# Patient Record
Sex: FEMALE | Race: WHITE | NOT HISPANIC OR LATINO | Employment: FULL TIME | URBAN - NONMETROPOLITAN AREA
[De-identification: names, ages, dates, MRNs, and addresses within clinical notes are randomized per-mention and may not be internally consistent; named-entity substitution may affect disease eponyms.]

---

## 2019-12-17 ENCOUNTER — TELEPHONE (OUTPATIENT)
Dept: SURGERY | Facility: CLINIC | Age: 44
End: 2019-12-17

## 2019-12-17 NOTE — TELEPHONE ENCOUNTER
I left a voice mail message for the patient in reference to Dr. Georges Aguirre.  Dr. Krause wanted her to have contact information for Dr. Aguirre, so that she could set up an appointment.    Dr. Georges Aguirre, [facility], [phone], [address] [place] [address], [place] [address].

## 2020-10-16 NOTE — PROGRESS NOTES
Patient: Maria Victoria Pena    YOB: 1975    Date: 10/21/2020    Primary Care Provider: Phi Snider PA    Chief Complaint   Patient presents with   • Abdominal Pain     Cramping and alot of rectal bleeding       SUBJECTIVE:    History of present illness:  I saw the patient in the office today as a consultation for evaluation and treatment of rectal bleeding. Patient has history of ulcerative colitis. She complains of diarrhea with blood about 40 times a day. Patient states it is difficult to eat anything. She is on steroids now that a Dr in [place] gave her on 10/09/20 and it isn't helping.  Last colonoscopy 15 years ago, patient was remission for many years.  No family history of colon cancer.  No weight loss or anemia.  No nausea or vomiting.  Pain is crampy in nature, not relieved by bowel movement.  Last 1 to 1-1/2 hours after eating.    The following portions of the patient's history were reviewed and updated as appropriate: allergies, current medications, past family history, past medical history, past social history, past surgical history and problem list.    Review of Systems   Constitutional: Negative for chills, fever and unexpected weight change.   HENT: Negative for hearing loss, trouble swallowing and voice change.    Eyes: Negative for visual disturbance.   Respiratory: Negative for apnea, cough, chest tightness, shortness of breath and wheezing.    Cardiovascular: Negative for chest pain, palpitations and leg swelling.   Gastrointestinal: Positive for abdominal pain, blood in stool and diarrhea. Negative for abdominal distention, anal bleeding, constipation, nausea, rectal pain and vomiting.   Endocrine: Negative for cold intolerance and heat intolerance.   Genitourinary: Negative for difficulty urinating, dysuria and flank pain.   Musculoskeletal: Negative for back pain and gait problem.   Skin: Negative for color change, rash and wound.   Neurological: Negative for dizziness,  "syncope, speech difficulty, weakness, light-headedness, numbness and headaches.   Hematological: Negative for adenopathy. Does not bruise/bleed easily.   Psychiatric/Behavioral: Negative for confusion. The patient is not nervous/anxious.        History:  Past Medical History:   Diagnosis Date   • Rectal bleeding        Past Surgical History:   Procedure Laterality Date   • COLONOSCOPY         Family History   Problem Relation Age of Onset   • Heart disease Father    • Cancer Maternal Aunt    • Diabetes Maternal Grandmother    • Heart disease Maternal Grandmother        Social History     Tobacco Use   • Smoking status: Never Smoker   • Smokeless tobacco: Never Used   Substance Use Topics   • Alcohol use: Never     Frequency: Never   • Drug use: Never       Medications:   Current Outpatient Medications:   •  predniSONE (DELTASONE) 10 MG tablet, TAKE 2 TABLETS BY MOUTH DAILY FOR 7 DAYS 1.5 TABLETS DAILY FOR 7 DAYS 1 TABLET DAILY FOR 7 DAYS THEN STOP, Disp: , Rfl:        Allergies: No Known Allergies    OBJECTIVE:    Vital Signs:  Vitals:    10/21/20 0904   BP: 120/82   Pulse: 78   Temp: 97.6 °F (36.4 °C)   SpO2: 99%   Weight: 81 kg (178 lb 9.6 oz)   Height: 154.9 cm (61\")       Physical Exam:   General Appearance:    Alert, cooperative, in no acute distress   Head:    Normocephalic, without obvious abnormality, atraumatic   Eyes:            Lids and lashes normal, conjunctivae and sclerae normal, no   icterus, no pallor, corneas clear, PERRL   Ears:    Ears appear intact with no abnormalities noted   Throat:   No oral lesions, no thrush, oral mucosa moist   Neck:   No adenopathy, supple, trachea midline, no thyromegaly,  no JVD   Lungs:     Clear to auscultation,respirations regular, even and                  unlabored    Heart:    Regular rhythm and normal rate, normal S1 and S2, no            murmur   Abdomen:     no masses, no organomegaly, soft non-tender, non-distended, no guarding   Extremities:   Moves all " extremities well, no edema, no cyanosis, no             redness   Pulses:   Pulses palpable and equal bilaterally   Skin:   No bleeding, bruising or rash   Lymph nodes:   No palpable adenopathy   Neurologic:   Cranial nerves 2 - 12 grossly intact, sensation intact  Psychiatric: No evidence of depression or anxiety   Results Review:   I reviewed the patient's new clinical results.    Review of Systems was reviewed and confirmed as accurate as documented by the MA.    ASSESSMENT/PLAN:    1. Ulcerative colitis with rectal bleeding, unspecified location (CMS/HCC)    2. Rectal bleed    3. Generalized abdominal pain        I recommend a colonoscopy for further evaluation. The procedure was explained as well as the risks which include but are not limited to bleeding, infection, perforation, abdominal pain etc. The patient understands these risks and the procedure and wishes to proceed.  Patient placed on probiotics and Greek yogurt daily.  Avoid dairy products.     Electronically signed by Manjula Merrill MD  10/21/20  10:05 EDT

## 2020-10-21 ENCOUNTER — OFFICE VISIT (OUTPATIENT)
Dept: SURGERY | Facility: CLINIC | Age: 45
End: 2020-10-21

## 2020-10-21 VITALS
DIASTOLIC BLOOD PRESSURE: 82 MMHG | HEIGHT: 61 IN | HEART RATE: 78 BPM | WEIGHT: 178.6 LBS | TEMPERATURE: 97.6 F | SYSTOLIC BLOOD PRESSURE: 120 MMHG | OXYGEN SATURATION: 99 % | BODY MASS INDEX: 33.72 KG/M2

## 2020-10-21 DIAGNOSIS — R10.84 GENERALIZED ABDOMINAL PAIN: ICD-10-CM

## 2020-10-21 DIAGNOSIS — K62.5 RECTAL BLEED: ICD-10-CM

## 2020-10-21 DIAGNOSIS — Z01.818 PRE-OP TESTING: Primary | ICD-10-CM

## 2020-10-21 DIAGNOSIS — K51.911 ULCERATIVE COLITIS WITH RECTAL BLEEDING, UNSPECIFIED LOCATION (HCC): Primary | ICD-10-CM

## 2020-10-21 PROCEDURE — 99203 OFFICE O/P NEW LOW 30 MIN: CPT | Performed by: SURGERY

## 2020-10-21 RX ORDER — PREDNISONE 10 MG/1
TABLET ORAL
COMMUNITY
Start: 2020-10-09

## 2020-10-22 RX ORDER — BISACODYL 5 MG
TABLET, DELAYED RELEASE (ENTERIC COATED) ORAL
Qty: 4 TABLET | Refills: 0 | Status: SHIPPED | OUTPATIENT
Start: 2020-10-22

## 2020-10-22 RX ORDER — POLYETHYLENE GLYCOL 3350 17 G/17G
238 POWDER, FOR SOLUTION ORAL ONCE
Qty: 1 PACKET | Refills: 0 | Status: SHIPPED | OUTPATIENT
Start: 2020-10-22 | End: 2020-10-22

## 2020-10-26 ENCOUNTER — LAB (OUTPATIENT)
Dept: LAB | Facility: HOSPITAL | Age: 45
End: 2020-10-26

## 2020-10-26 ENCOUNTER — APPOINTMENT (OUTPATIENT)
Dept: LAB | Facility: HOSPITAL | Age: 45
End: 2020-10-26

## 2020-10-26 DIAGNOSIS — Z01.818 PRE-OP TESTING: Primary | ICD-10-CM

## 2020-10-26 PROCEDURE — U0004 COV-19 TEST NON-CDC HGH THRU: HCPCS | Performed by: SURGERY

## 2020-10-26 PROCEDURE — C9803 HOPD COVID-19 SPEC COLLECT: HCPCS

## 2020-10-26 RX ORDER — POLYETHYLENE GLYCOL 3350 17 G/17G
17 POWDER, FOR SOLUTION ORAL DAILY
COMMUNITY

## 2020-10-27 LAB — SARS-COV-2 RNA RESP QL NAA+PROBE: NOT DETECTED

## 2020-10-28 ENCOUNTER — ANESTHESIA (OUTPATIENT)
Dept: GASTROENTEROLOGY | Facility: HOSPITAL | Age: 45
End: 2020-10-28

## 2020-10-28 ENCOUNTER — ANESTHESIA EVENT (OUTPATIENT)
Dept: GASTROENTEROLOGY | Facility: HOSPITAL | Age: 45
End: 2020-10-28

## 2020-10-28 ENCOUNTER — HOSPITAL ENCOUNTER (OUTPATIENT)
Facility: HOSPITAL | Age: 45
Setting detail: HOSPITAL OUTPATIENT SURGERY
Discharge: HOME | End: 2020-10-28
Attending: SURGERY | Admitting: SURGERY

## 2020-10-28 VITALS
TEMPERATURE: 97.8 F | OXYGEN SATURATION: 100 % | DIASTOLIC BLOOD PRESSURE: 76 MMHG | HEIGHT: 61 IN | RESPIRATION RATE: 18 BRPM | SYSTOLIC BLOOD PRESSURE: 126 MMHG | BODY MASS INDEX: 32.71 KG/M2 | HEART RATE: 77 BPM | WEIGHT: 173.25 LBS

## 2020-10-28 DIAGNOSIS — R10.84 GENERALIZED ABDOMINAL PAIN: ICD-10-CM

## 2020-10-28 DIAGNOSIS — K51.911 ULCERATIVE COLITIS WITH RECTAL BLEEDING, UNSPECIFIED LOCATION (HCC): ICD-10-CM

## 2020-10-28 DIAGNOSIS — K62.5 RECTAL BLEED: ICD-10-CM

## 2020-10-28 LAB
B-HCG UR QL: NEGATIVE
INTERNAL NEGATIVE CONTROL: NEGATIVE
INTERNAL POSITIVE CONTROL: POSITIVE
Lab: NORMAL

## 2020-10-28 PROCEDURE — 81025 URINE PREGNANCY TEST: CPT | Performed by: SURGERY

## 2020-10-28 PROCEDURE — 45380 COLONOSCOPY AND BIOPSY: CPT | Performed by: SURGERY

## 2020-10-28 PROCEDURE — 25010000002 PROPOFOL 10 MG/ML EMULSION: Performed by: NURSE ANESTHETIST, CERTIFIED REGISTERED

## 2020-10-28 RX ORDER — KETAMINE HYDROCHLORIDE 50 MG/ML
INJECTION, SOLUTION, CONCENTRATE INTRAMUSCULAR; INTRAVENOUS AS NEEDED
Status: DISCONTINUED | OUTPATIENT
Start: 2020-10-28 | End: 2020-10-28 | Stop reason: SURG

## 2020-10-28 RX ORDER — ONDANSETRON 2 MG/ML
4 INJECTION INTRAMUSCULAR; INTRAVENOUS ONCE AS NEEDED
Status: DISCONTINUED | OUTPATIENT
Start: 2020-10-28 | End: 2020-10-28 | Stop reason: HOSPADM

## 2020-10-28 RX ORDER — ONDANSETRON 2 MG/ML
4 INJECTION INTRAMUSCULAR; INTRAVENOUS ONCE AS NEEDED
Status: CANCELLED | OUTPATIENT
Start: 2020-10-28 | End: 2020-10-28

## 2020-10-28 RX ORDER — LIDOCAINE HYDROCHLORIDE 20 MG/ML
INJECTION, SOLUTION INTRAVENOUS AS NEEDED
Status: DISCONTINUED | OUTPATIENT
Start: 2020-10-28 | End: 2020-10-28 | Stop reason: SURG

## 2020-10-28 RX ORDER — SODIUM CHLORIDE, SODIUM LACTATE, POTASSIUM CHLORIDE, CALCIUM CHLORIDE 600; 310; 30; 20 MG/100ML; MG/100ML; MG/100ML; MG/100ML
1000 INJECTION, SOLUTION INTRAVENOUS CONTINUOUS
Status: DISCONTINUED | OUTPATIENT
Start: 2020-10-28 | End: 2020-10-28 | Stop reason: HOSPADM

## 2020-10-28 RX ORDER — PROPOFOL 10 MG/ML
VIAL (ML) INTRAVENOUS AS NEEDED
Status: DISCONTINUED | OUTPATIENT
Start: 2020-10-28 | End: 2020-10-28 | Stop reason: SURG

## 2020-10-28 RX ADMIN — LIDOCAINE HYDROCHLORIDE 100 MG: 20 INJECTION, SOLUTION INTRAVENOUS at 13:43

## 2020-10-28 RX ADMIN — PROPOFOL 350 MG: 10 INJECTION, EMULSION INTRAVENOUS at 13:57

## 2020-10-28 RX ADMIN — KETAMINE HYDROCHLORIDE 50 MG: 50 INJECTION, SOLUTION INTRAMUSCULAR; INTRAVENOUS at 13:43

## 2020-10-28 RX ADMIN — SODIUM CHLORIDE, POTASSIUM CHLORIDE, SODIUM LACTATE AND CALCIUM CHLORIDE 1000 ML: 600; 310; 30; 20 INJECTION, SOLUTION INTRAVENOUS at 12:58

## 2020-10-28 NOTE — ANESTHESIA PREPROCEDURE EVALUATION
Anesthesia Evaluation     Patient summary reviewed and Nursing notes reviewed   history of anesthetic complications (history of anesthesia awareness ):  NPO Solid Status: > 8 hours  NPO Liquid Status: > 8 hours           Airway   Mallampati: II  TM distance: >3 FB  Neck ROM: full  No difficulty expected  Dental - normal exam     Pulmonary - normal exam   (+) a smoker (quit in 2000) Former,   Cardiovascular - normal exam        Neuro/Psych  GI/Hepatic/Renal/Endo    (+)  GI bleeding (ulerative colitis) , renal disease stones,     Musculoskeletal     Abdominal    Substance History      OB/GYN          Other                        Anesthesia Plan    ASA 2     MAC   (Risks and benefits discussed including risk of aspiration, recall and dental damage. All patient questions answered. Will continue with POC.)  intravenous induction     Anesthetic plan, all risks, benefits, and alternatives have been provided, discussed and informed consent has been obtained with: patient.    Plan discussed with CRNA.

## 2020-10-29 ENCOUNTER — TELEPHONE (OUTPATIENT)
Dept: SURGERY | Facility: CLINIC | Age: 45
End: 2020-10-29

## 2020-10-29 NOTE — TELEPHONE ENCOUNTER
Patient called stating she has ulcerative colitis and is requesting a strong steroid prescription to help with symptoms. She states she is unable to work at this time due to having a flare up.

## 2020-10-29 NOTE — TELEPHONE ENCOUNTER
I am not sure which medication is best.  I would recommend appointment with gastroenterology at [facility] about the [place] for evaluation and treatment plan.  Will await final pathology report and discussed with patient on follow-up.  Have her continue Questran to alleviate diarrhea symptoms.

## 2020-10-29 NOTE — TELEPHONE ENCOUNTER
I will place patient on Questran, however try that for diarrhea.  She is already on prednisone, a big dose.  I am not sure is the right treatment.  I will likely send her to a specialist to get treated for ulcerative colitis.  Go ahead and try to get an appointment with gastroenterology at [facility] in [place] for treatment plan.

## 2020-10-29 NOTE — TELEPHONE ENCOUNTER
Patient states she request a new medication for ulcerative colitis. She states what you feel is best for her symptoms.  Please advise

## 2020-10-29 NOTE — TELEPHONE ENCOUNTER
Patient will need to follow-up with her ulcerative colitis  For stronger prednisone dose.  Or have family physician do it.  I usually do not write for steroids.

## 2020-10-31 LAB
LAB AP CASE REPORT: NORMAL
PATH REPORT.FINAL DX SPEC: NORMAL

## 2020-11-05 NOTE — PROGRESS NOTES
Patient: Maria Victoria Pena    YOB: 1975    Date: 11/06/2020    Primary Care Provider: Phi Snider PA    Reason for Consultation: Follow-up colonoscopy    Chief Complaint   Patient presents with   • Follow-up     Colonoscopy       History of present illness:  I saw the patient in the office today as a followup from their recent colonoscopy with polypectomy, the pathology report did show chronic colitis with focal activity.  Patient continues to have many bowel movements a day.  Some days up to 40 a day.  She states that they are bloody if she has not eaten and mucus after eating.  The diarrhea has been ongoing for approximately 6 weeks.  Patient improving clinically, diarrhea decreased.  Patient with history of ulcerative colitis.  Has been on several regimens of steroids per family physician.    The following portions of the patient's history were reviewed and updated as appropriate: allergies, current medications, past family history, past medical history, past social history, past surgical history and problem list.    Review of Systems   Constitutional: Negative for chills, fever and unexpected weight change.   HENT: Negative for hearing loss, trouble swallowing and voice change.    Eyes: Negative for visual disturbance.   Respiratory: Negative for apnea, cough, chest tightness, shortness of breath and wheezing.    Cardiovascular: Negative for chest pain, palpitations and leg swelling.   Gastrointestinal: Positive for abdominal pain, blood in stool and diarrhea. Negative for abdominal distention, anal bleeding, constipation, nausea, rectal pain and vomiting.   Endocrine: Negative for cold intolerance and heat intolerance.   Genitourinary: Negative for difficulty urinating, dysuria and flank pain.   Musculoskeletal: Negative for back pain and gait problem.   Skin: Negative for color change, rash and wound.   Neurological: Negative for dizziness, syncope, speech difficulty, weakness,  "light-headedness, numbness and headaches.   Hematological: Negative for adenopathy. Does not bruise/bleed easily.   Psychiatric/Behavioral: Negative for confusion. The patient is not nervous/anxious.        Allergies:  No Known Allergies    Medications:    Current Outpatient Medications:   •  bisacodyl (Dulcolax) 5 MG EC tablet, Take 2 at 3pm and 2 at 7pm day prior to colonoscopy., Disp: 4 tablet, Rfl: 0  •  cholestyramine (Questran) 4 GM/DOSE powder, Take 1 packet by mouth 3 (Three) Times a Day With Meals., Disp: 30 packet, Rfl: 6  •  polyethylene glycol (MiraLax) 17 g packet, Take 17 g by mouth Daily., Disp: , Rfl:   •  predniSONE (DELTASONE) 10 MG tablet, TAKE 2 TABLETS BY MOUTH DAILY FOR 7 DAYS 1.5 TABLETS DAILY FOR 7 DAYS 1 TABLET DAILY FOR 7 DAYS THEN STOP, Disp: , Rfl:     Vital Signs:  Vitals:    11/06/20 1452   BP: 140/100   Pulse: 82   Temp: 98.6 °F (37 °C)   TempSrc: Temporal   SpO2: 99%   Weight: 78.5 kg (173 lb)   Height: 154.9 cm (61\")       Physical Exam:   General Appearance:    Alert, cooperative, in no acute distress   Abdomen:     no masses, no organomegaly, soft with minimal left lower quadrant tenderness.  No rebound or guarding.   Chest:      Clear to ausculation            Cor:     Regular rate and rhythm    Results Review:   I reviewed the patient's new clinical results.    Assessment / Plan:    1. Colitis    2. Chronic diarrhea        I did discuss the situation with the patient today in the office and they have done well from their recent colonoscopy with polypectomy.  I have released the patient back to normal activity.  I need to see the patient back in the office in 5 years and they will need to have repeat colonoscopy at that time.  Patient started on Questran for diarrhea, also referred to gastroenterology for management of possible recurrence of ulcerative colitis.    Electronically signed by Manjula Merrill MD  11/06/20                  "

## 2020-11-06 ENCOUNTER — OFFICE VISIT (OUTPATIENT)
Dept: SURGERY | Facility: CLINIC | Age: 45
End: 2020-11-06

## 2020-11-06 VITALS
TEMPERATURE: 98.6 F | HEIGHT: 61 IN | SYSTOLIC BLOOD PRESSURE: 140 MMHG | BODY MASS INDEX: 32.66 KG/M2 | HEART RATE: 82 BPM | OXYGEN SATURATION: 99 % | DIASTOLIC BLOOD PRESSURE: 100 MMHG | WEIGHT: 173 LBS

## 2020-11-06 DIAGNOSIS — K52.9 COLITIS: Primary | ICD-10-CM

## 2020-11-06 DIAGNOSIS — K52.9 CHRONIC DIARRHEA: ICD-10-CM

## 2020-11-06 PROCEDURE — 99212 OFFICE O/P EST SF 10 MIN: CPT | Performed by: SURGERY

## 2020-11-06 RX ORDER — CHOLESTYRAMINE 4 G/9G
4 POWDER, FOR SUSPENSION ORAL
Qty: 30 PACKET | Refills: 6 | Status: SHIPPED | OUTPATIENT
Start: 2020-11-06

## 2023-07-28 ENCOUNTER — APPOINTMENT (OUTPATIENT)
Dept: CT IMAGING | Facility: HOSPITAL | Age: 48
DRG: 387 | End: 2023-07-28
Payer: COMMERCIAL

## 2023-07-28 ENCOUNTER — ANESTHESIA EVENT (OUTPATIENT)
Dept: GASTROENTEROLOGY | Facility: HOSPITAL | Age: 48
DRG: 387 | End: 2023-07-28
Payer: COMMERCIAL

## 2023-07-28 ENCOUNTER — HOSPITAL ENCOUNTER (INPATIENT)
Facility: HOSPITAL | Age: 48
LOS: 5 days | Discharge: HOME | DRG: 387 | End: 2023-08-02
Attending: EMERGENCY MEDICINE | Admitting: INTERNAL MEDICINE
Payer: COMMERCIAL

## 2023-07-28 DIAGNOSIS — K51.011 ULCERATIVE PANCOLITIS WITH RECTAL BLEEDING: ICD-10-CM

## 2023-07-28 DIAGNOSIS — Z87.19 HISTORY OF ULCERATIVE COLITIS: ICD-10-CM

## 2023-07-28 DIAGNOSIS — R70.0 SEDIMENTATION RATE ELEVATION: ICD-10-CM

## 2023-07-28 DIAGNOSIS — R10.84 GENERALIZED ABDOMINAL PAIN: ICD-10-CM

## 2023-07-28 DIAGNOSIS — K51.00 PANCOLITIS: ICD-10-CM

## 2023-07-28 DIAGNOSIS — K92.1 HEMATOCHEZIA: Primary | ICD-10-CM

## 2023-07-28 PROBLEM — R16.1 SPLENOMEGALY: Status: ACTIVE | Noted: 2023-07-28

## 2023-07-28 PROBLEM — R19.7 BLOODY DIARRHEA: Status: ACTIVE | Noted: 2023-07-28

## 2023-07-28 LAB
ADV 40+41 DNA STL QL NAA+NON-PROBE: NOT DETECTED
ALBUMIN SERPL-MCNC: 3.5 G/DL (ref 3.5–5.2)
ALBUMIN/GLOB SERPL: 1.3 G/DL
ALP SERPL-CCNC: 70 U/L (ref 39–117)
ALT SERPL W P-5'-P-CCNC: 12 U/L (ref 1–33)
ANION GAP SERPL CALCULATED.3IONS-SCNC: 11 MMOL/L (ref 5–15)
AST SERPL-CCNC: 17 U/L (ref 1–32)
ASTRO TYP 1-8 RNA STL QL NAA+NON-PROBE: NOT DETECTED
BACTERIA UR QL AUTO: ABNORMAL /HPF
BASOPHILS # BLD AUTO: 0.05 10*3/MM3 (ref 0–0.2)
BASOPHILS NFR BLD AUTO: 0.8 % (ref 0–1.5)
BILIRUB SERPL-MCNC: 0.3 MG/DL (ref 0–1.2)
BILIRUB UR QL STRIP: NEGATIVE
BUN SERPL-MCNC: 4 MG/DL (ref 6–20)
BUN/CREAT SERPL: 8 (ref 7–25)
C CAYETANENSIS DNA STL QL NAA+NON-PROBE: NOT DETECTED
C COLI+JEJ+UPSA DNA STL QL NAA+NON-PROBE: NOT DETECTED
C DIFF TOX GENS STL QL NAA+PROBE: NOT DETECTED
CALCIUM SPEC-SCNC: 8.5 MG/DL (ref 8.6–10.5)
CHLORIDE SERPL-SCNC: 105 MMOL/L (ref 98–107)
CLARITY UR: CLEAR
CO2 SERPL-SCNC: 24 MMOL/L (ref 22–29)
COLOR UR: YELLOW
CREAT SERPL-MCNC: 0.5 MG/DL (ref 0.57–1)
CRP SERPL-MCNC: 2.76 MG/DL (ref 0–0.5)
CRYPTOSP DNA STL QL NAA+NON-PROBE: NOT DETECTED
D-LACTATE SERPL-SCNC: 0.9 MMOL/L (ref 0.5–2)
DEPRECATED RDW RBC AUTO: 42.7 FL (ref 37–54)
E HISTOLYT DNA STL QL NAA+NON-PROBE: NOT DETECTED
EAEC PAA PLAS AGGR+AATA ST NAA+NON-PRB: NOT DETECTED
EC STX1+STX2 GENES STL QL NAA+NON-PROBE: NOT DETECTED
EGFRCR SERPLBLD CKD-EPI 2021: 118 ML/MIN/1.73
EOSINOPHIL # BLD AUTO: 0.42 10*3/MM3 (ref 0–0.4)
EOSINOPHIL NFR BLD AUTO: 6.7 % (ref 0.3–6.2)
EPEC EAE GENE STL QL NAA+NON-PROBE: NOT DETECTED
ERYTHROCYTE [DISTWIDTH] IN BLOOD BY AUTOMATED COUNT: 14.6 % (ref 12.3–15.4)
ERYTHROCYTE [SEDIMENTATION RATE] IN BLOOD: 34 MM/HR (ref 0–20)
ETEC LTA+ST1A+ST1B TOX ST NAA+NON-PROBE: NOT DETECTED
G LAMBLIA DNA STL QL NAA+NON-PROBE: NOT DETECTED
GLOBULIN UR ELPH-MCNC: 2.8 GM/DL
GLUCOSE SERPL-MCNC: 90 MG/DL (ref 65–99)
GLUCOSE UR STRIP-MCNC: NEGATIVE MG/DL
HBV SURFACE AG SERPL QL IA: NORMAL
HCG INTACT+B SERPL-ACNC: <0.1 MIU/ML
HCT VFR BLD AUTO: 35.8 % (ref 34–46.6)
HEMOCCULT STL QL: POSITIVE
HGB BLD-MCNC: 11.2 G/DL (ref 12–15.9)
HGB UR QL STRIP.AUTO: ABNORMAL
HYALINE CASTS UR QL AUTO: ABNORMAL /LPF
IMM GRANULOCYTES # BLD AUTO: 0.04 10*3/MM3 (ref 0–0.05)
IMM GRANULOCYTES NFR BLD AUTO: 0.6 % (ref 0–0.5)
KETONES UR QL STRIP: ABNORMAL
LEUKOCYTE ESTERASE UR QL STRIP.AUTO: NEGATIVE
LYMPHOCYTES # BLD AUTO: 1.42 10*3/MM3 (ref 0.7–3.1)
LYMPHOCYTES NFR BLD AUTO: 22.7 % (ref 19.6–45.3)
MCH RBC QN AUTO: 25.3 PG (ref 26.6–33)
MCHC RBC AUTO-ENTMCNC: 31.3 G/DL (ref 31.5–35.7)
MCV RBC AUTO: 80.8 FL (ref 79–97)
MONOCYTES # BLD AUTO: 0.8 10*3/MM3 (ref 0.1–0.9)
MONOCYTES NFR BLD AUTO: 12.8 % (ref 5–12)
NEUTROPHILS NFR BLD AUTO: 3.52 10*3/MM3 (ref 1.7–7)
NEUTROPHILS NFR BLD AUTO: 56.4 % (ref 42.7–76)
NITRITE UR QL STRIP: NEGATIVE
NOROVIRUS GI+II RNA STL QL NAA+NON-PROBE: NOT DETECTED
NRBC BLD AUTO-RTO: 0 /100 WBC (ref 0–0.2)
P SHIGELLOIDES DNA STL QL NAA+NON-PROBE: NOT DETECTED
PH UR STRIP.AUTO: 6 [PH] (ref 5–8)
PLATELET # BLD AUTO: 253 10*3/MM3 (ref 140–450)
PMV BLD AUTO: 10.5 FL (ref 6–12)
POTASSIUM SERPL-SCNC: 4 MMOL/L (ref 3.5–5.2)
PROCALCITONIN SERPL-MCNC: 0.08 NG/ML (ref 0–0.25)
PROT SERPL-MCNC: 6.3 G/DL (ref 6–8.5)
PROT UR QL STRIP: NEGATIVE
RBC # BLD AUTO: 4.43 10*6/MM3 (ref 3.77–5.28)
RBC # UR STRIP: ABNORMAL /HPF
REF LAB TEST METHOD: ABNORMAL
RVA RNA STL QL NAA+NON-PROBE: NOT DETECTED
S ENT+BONG DNA STL QL NAA+NON-PROBE: NOT DETECTED
SAPO I+II+IV+V RNA STL QL NAA+NON-PROBE: NOT DETECTED
SHIGELLA SP+EIEC IPAH ST NAA+NON-PROBE: NOT DETECTED
SODIUM SERPL-SCNC: 140 MMOL/L (ref 136–145)
SP GR UR STRIP: 1.07 (ref 1–1.03)
SQUAMOUS #/AREA URNS HPF: ABNORMAL /HPF
UROBILINOGEN UR QL STRIP: ABNORMAL
V CHOL+PARA+VUL DNA STL QL NAA+NON-PROBE: NOT DETECTED
V CHOLERAE DNA STL QL NAA+NON-PROBE: NOT DETECTED
WBC # UR STRIP: ABNORMAL /HPF
WBC NRBC COR # BLD: 6.25 10*3/MM3 (ref 3.4–10.8)
Y ENTEROCOL DNA STL QL NAA+NON-PROBE: NOT DETECTED

## 2023-07-28 PROCEDURE — 81001 URINALYSIS AUTO W/SCOPE: CPT | Performed by: INTERNAL MEDICINE

## 2023-07-28 PROCEDURE — 25010000002 HYDROMORPHONE PER 4 MG: Performed by: EMERGENCY MEDICINE

## 2023-07-28 PROCEDURE — 87507 IADNA-DNA/RNA PROBE TQ 12-25: CPT | Performed by: EMERGENCY MEDICINE

## 2023-07-28 PROCEDURE — 99285 EMERGENCY DEPT VISIT HI MDM: CPT

## 2023-07-28 PROCEDURE — 25510000001 IOPAMIDOL 61 % SOLUTION: Performed by: INTERNAL MEDICINE

## 2023-07-28 PROCEDURE — 85025 COMPLETE CBC W/AUTO DIFF WBC: CPT | Performed by: EMERGENCY MEDICINE

## 2023-07-28 PROCEDURE — 87340 HEPATITIS B SURFACE AG IA: CPT | Performed by: PHYSICIAN ASSISTANT

## 2023-07-28 PROCEDURE — 86140 C-REACTIVE PROTEIN: CPT | Performed by: PHYSICIAN ASSISTANT

## 2023-07-28 PROCEDURE — 85652 RBC SED RATE AUTOMATED: CPT | Performed by: EMERGENCY MEDICINE

## 2023-07-28 PROCEDURE — 99222 1ST HOSP IP/OBS MODERATE 55: CPT | Performed by: PHYSICIAN ASSISTANT

## 2023-07-28 PROCEDURE — 80053 COMPREHEN METABOLIC PANEL: CPT | Performed by: EMERGENCY MEDICINE

## 2023-07-28 PROCEDURE — 99222 1ST HOSP IP/OBS MODERATE 55: CPT | Performed by: INTERNAL MEDICINE

## 2023-07-28 PROCEDURE — 25010000002 MORPHINE PER 10 MG: Performed by: INTERNAL MEDICINE

## 2023-07-28 PROCEDURE — 25010000002 ONDANSETRON PER 1 MG: Performed by: EMERGENCY MEDICINE

## 2023-07-28 PROCEDURE — 84702 CHORIONIC GONADOTROPIN TEST: CPT | Performed by: INTERNAL MEDICINE

## 2023-07-28 PROCEDURE — 74177 CT ABD & PELVIS W/CONTRAST: CPT

## 2023-07-28 PROCEDURE — 82272 OCCULT BLD FECES 1-3 TESTS: CPT | Performed by: EMERGENCY MEDICINE

## 2023-07-28 PROCEDURE — 83605 ASSAY OF LACTIC ACID: CPT | Performed by: EMERGENCY MEDICINE

## 2023-07-28 PROCEDURE — 84145 PROCALCITONIN (PCT): CPT | Performed by: EMERGENCY MEDICINE

## 2023-07-28 PROCEDURE — 87385 HISTOPLASMA CAPSUL AG IA: CPT | Performed by: PHYSICIAN ASSISTANT

## 2023-07-28 PROCEDURE — 86480 TB TEST CELL IMMUN MEASURE: CPT | Performed by: PHYSICIAN ASSISTANT

## 2023-07-28 PROCEDURE — 87493 C DIFF AMPLIFIED PROBE: CPT | Performed by: EMERGENCY MEDICINE

## 2023-07-28 RX ORDER — ONDANSETRON 2 MG/ML
4 INJECTION INTRAMUSCULAR; INTRAVENOUS ONCE
Status: COMPLETED | OUTPATIENT
Start: 2023-07-28 | End: 2023-07-28

## 2023-07-28 RX ORDER — FAMOTIDINE 20 MG/1
20 TABLET, FILM COATED ORAL ONCE
Status: CANCELLED | OUTPATIENT
Start: 2023-07-28 | End: 2023-07-28

## 2023-07-28 RX ORDER — SODIUM CHLORIDE 0.9 % (FLUSH) 0.9 %
10 SYRINGE (ML) INJECTION EVERY 12 HOURS SCHEDULED
Status: DISCONTINUED | OUTPATIENT
Start: 2023-07-28 | End: 2023-08-02 | Stop reason: HOSPADM

## 2023-07-28 RX ORDER — SODIUM CHLORIDE 9 MG/ML
100 INJECTION, SOLUTION INTRAVENOUS CONTINUOUS
Status: ACTIVE | OUTPATIENT
Start: 2023-07-28 | End: 2023-07-29

## 2023-07-28 RX ORDER — SODIUM CHLORIDE 9 MG/ML
40 INJECTION, SOLUTION INTRAVENOUS AS NEEDED
Status: CANCELLED | OUTPATIENT
Start: 2023-07-28

## 2023-07-28 RX ORDER — ACETAMINOPHEN 325 MG/1
650 TABLET ORAL EVERY 4 HOURS PRN
Status: DISCONTINUED | OUTPATIENT
Start: 2023-07-28 | End: 2023-08-02 | Stop reason: HOSPADM

## 2023-07-28 RX ORDER — BISACODYL 5 MG/1
5 TABLET, DELAYED RELEASE ORAL DAILY PRN
Status: DISCONTINUED | OUTPATIENT
Start: 2023-07-28 | End: 2023-08-02 | Stop reason: HOSPADM

## 2023-07-28 RX ORDER — SODIUM CHLORIDE 0.9 % (FLUSH) 0.9 %
10 SYRINGE (ML) INJECTION AS NEEDED
Status: DISCONTINUED | OUTPATIENT
Start: 2023-07-28 | End: 2023-08-02 | Stop reason: HOSPADM

## 2023-07-28 RX ORDER — HYDROCODONE BITARTRATE AND ACETAMINOPHEN 5; 325 MG/1; MG/1
1 TABLET ORAL EVERY 6 HOURS PRN
Status: DISCONTINUED | OUTPATIENT
Start: 2023-07-28 | End: 2023-08-02 | Stop reason: HOSPADM

## 2023-07-28 RX ORDER — ACETAMINOPHEN 160 MG/5ML
650 SOLUTION ORAL EVERY 4 HOURS PRN
Status: DISCONTINUED | OUTPATIENT
Start: 2023-07-28 | End: 2023-08-02 | Stop reason: HOSPADM

## 2023-07-28 RX ORDER — CALCIUM CARBONATE 500 MG/1
2 TABLET, CHEWABLE ORAL 2 TIMES DAILY PRN
Status: DISCONTINUED | OUTPATIENT
Start: 2023-07-28 | End: 2023-08-02 | Stop reason: HOSPADM

## 2023-07-28 RX ORDER — FAMOTIDINE 10 MG/ML
20 INJECTION, SOLUTION INTRAVENOUS ONCE
Status: CANCELLED | OUTPATIENT
Start: 2023-07-28 | End: 2023-07-28

## 2023-07-28 RX ORDER — SODIUM CHLORIDE 9 MG/ML
40 INJECTION, SOLUTION INTRAVENOUS AS NEEDED
Status: DISCONTINUED | OUTPATIENT
Start: 2023-07-28 | End: 2023-08-02 | Stop reason: HOSPADM

## 2023-07-28 RX ORDER — LIDOCAINE HYDROCHLORIDE 10 MG/ML
0.5 INJECTION, SOLUTION EPIDURAL; INFILTRATION; INTRACAUDAL; PERINEURAL ONCE AS NEEDED
Status: CANCELLED | OUTPATIENT
Start: 2023-07-28

## 2023-07-28 RX ORDER — MIDAZOLAM HYDROCHLORIDE 1 MG/ML
1 INJECTION INTRAMUSCULAR; INTRAVENOUS
Status: CANCELLED | OUTPATIENT
Start: 2023-07-28

## 2023-07-28 RX ORDER — POLYETHYLENE GLYCOL 3350 17 G/17G
17 POWDER, FOR SOLUTION ORAL DAILY PRN
Status: DISCONTINUED | OUTPATIENT
Start: 2023-07-28 | End: 2023-08-02 | Stop reason: HOSPADM

## 2023-07-28 RX ORDER — SODIUM CHLORIDE, SODIUM LACTATE, POTASSIUM CHLORIDE, CALCIUM CHLORIDE 600; 310; 30; 20 MG/100ML; MG/100ML; MG/100ML; MG/100ML
9 INJECTION, SOLUTION INTRAVENOUS CONTINUOUS
Status: CANCELLED | OUTPATIENT
Start: 2023-07-28

## 2023-07-28 RX ORDER — PEG-3350, SODIUM SULFATE, SODIUM CHLORIDE, POTASSIUM CHLORIDE, SODIUM ASCORBATE AND ASCORBIC ACID 7.5-2.691G
1000 KIT ORAL
Status: COMPLETED | OUTPATIENT
Start: 2023-07-28 | End: 2023-07-28

## 2023-07-28 RX ORDER — PEG-3350, SODIUM SULFATE, SODIUM CHLORIDE, POTASSIUM CHLORIDE, SODIUM ASCORBATE AND ASCORBIC ACID 7.5-2.691G
1000 KIT ORAL
Status: ACTIVE | OUTPATIENT
Start: 2023-07-29 | End: 2023-07-29

## 2023-07-28 RX ORDER — DICYCLOMINE HYDROCHLORIDE 10 MG/1
10 CAPSULE ORAL 4 TIMES DAILY
Status: DISCONTINUED | OUTPATIENT
Start: 2023-07-28 | End: 2023-08-02 | Stop reason: HOSPADM

## 2023-07-28 RX ORDER — HYDROMORPHONE HYDROCHLORIDE 1 MG/ML
0.25 INJECTION, SOLUTION INTRAMUSCULAR; INTRAVENOUS; SUBCUTANEOUS ONCE
Status: COMPLETED | OUTPATIENT
Start: 2023-07-28 | End: 2023-07-28

## 2023-07-28 RX ORDER — SODIUM CHLORIDE 0.9 % (FLUSH) 0.9 %
10 SYRINGE (ML) INJECTION EVERY 12 HOURS SCHEDULED
Status: CANCELLED | OUTPATIENT
Start: 2023-07-28

## 2023-07-28 RX ORDER — ACETAMINOPHEN 650 MG/1
650 SUPPOSITORY RECTAL EVERY 4 HOURS PRN
Status: DISCONTINUED | OUTPATIENT
Start: 2023-07-28 | End: 2023-08-02 | Stop reason: HOSPADM

## 2023-07-28 RX ORDER — PREDNISONE 20 MG/1
TABLET ORAL
COMMUNITY
End: 2023-07-28

## 2023-07-28 RX ORDER — MORPHINE SULFATE 2 MG/ML
2 INJECTION, SOLUTION INTRAMUSCULAR; INTRAVENOUS EVERY 4 HOURS PRN
Status: DISCONTINUED | OUTPATIENT
Start: 2023-07-28 | End: 2023-08-02 | Stop reason: HOSPADM

## 2023-07-28 RX ORDER — BISACODYL 10 MG
10 SUPPOSITORY, RECTAL RECTAL DAILY PRN
Status: DISCONTINUED | OUTPATIENT
Start: 2023-07-28 | End: 2023-08-02 | Stop reason: HOSPADM

## 2023-07-28 RX ORDER — SODIUM CHLORIDE 0.9 % (FLUSH) 0.9 %
10 SYRINGE (ML) INJECTION AS NEEDED
Status: CANCELLED | OUTPATIENT
Start: 2023-07-28

## 2023-07-28 RX ORDER — AMOXICILLIN 250 MG
2 CAPSULE ORAL 2 TIMES DAILY
Status: DISCONTINUED | OUTPATIENT
Start: 2023-07-28 | End: 2023-08-02 | Stop reason: HOSPADM

## 2023-07-28 RX ADMIN — Medication 10 ML: at 21:05

## 2023-07-28 RX ADMIN — HYDROCODONE BITARTRATE AND ACETAMINOPHEN 1 TABLET: 5; 325 TABLET ORAL at 22:20

## 2023-07-28 RX ADMIN — IOPAMIDOL 85 ML: 612 INJECTION, SOLUTION INTRAVENOUS at 15:10

## 2023-07-28 RX ADMIN — MORPHINE SULFATE 2 MG: 2 INJECTION, SOLUTION INTRAMUSCULAR; INTRAVENOUS at 15:44

## 2023-07-28 RX ADMIN — SODIUM CHLORIDE 100 ML/HR: 9 INJECTION, SOLUTION INTRAVENOUS at 17:11

## 2023-07-28 RX ADMIN — DICYCLOMINE HYDROCHLORIDE 10 MG: 10 CAPSULE ORAL at 20:39

## 2023-07-28 RX ADMIN — DICYCLOMINE HYDROCHLORIDE 10 MG: 10 CAPSULE ORAL at 17:11

## 2023-07-28 RX ADMIN — ONDANSETRON 4 MG: 2 INJECTION INTRAMUSCULAR; INTRAVENOUS at 13:07

## 2023-07-28 RX ADMIN — SODIUM CHLORIDE, POTASSIUM CHLORIDE, SODIUM LACTATE AND CALCIUM CHLORIDE 1434 ML: 600; 310; 30; 20 INJECTION, SOLUTION INTRAVENOUS at 13:07

## 2023-07-28 RX ADMIN — MORPHINE SULFATE 2 MG: 2 INJECTION, SOLUTION INTRAMUSCULAR; INTRAVENOUS at 20:39

## 2023-07-28 RX ADMIN — HYDROMORPHONE HYDROCHLORIDE 0.25 MG: 1 INJECTION, SOLUTION INTRAMUSCULAR; INTRAVENOUS; SUBCUTANEOUS at 13:08

## 2023-07-28 RX ADMIN — POLYETHYLENE GLYCOL 3350, SODIUM SULFATE, SODIUM CHLORIDE, POTASSIUM CHLORIDE, SODIUM ASCORBATE, AND ASCORBIC ACID 1000 ML: KIT at 17:11

## 2023-07-28 RX ADMIN — HYDROCODONE BITARTRATE AND ACETAMINOPHEN 1 TABLET: 5; 325 TABLET ORAL at 15:44

## 2023-07-28 NOTE — CONSULTS
[facility] Gastroenterology Consult    Referring Provider: Maria De Jesus Goode DO     PCP: Phi Snider PA    Reason for Consultation: Abdominal pain and diarrhea     Chief complaint: Abdominal pain and diarrhea     History of present illness:    Maria Victoria Pena is a 47 y.o. female who is admitted with diffuse abdominal pain and diarrhea.  She describes a diagnosis of ulcerative colitis 19 years ago.    She has since received intermittent steroid tapers but denies any treatment of inflammatory bowel disease.   She has recent increased life stress with a terminal illness in her .  She has had increased abdominal cramping and loose diarrhea.   She has up to 100 stools daily including nocturnal stools.     She had a colonoscopy with Dr. Manjula Merrill in October 2020.  This showed mucosa vascular pattern in the sigmoid colon was segmentally increased.  Biopsy showed chronic colitis with focal activity.      Allergies:  Patient has no known allergies.    Scheduled Meds:        Infusions:       PRN Meds:    [COMPLETED] Insert Peripheral IV **AND** sodium chloride    Home Meds:  (Not in a hospital admission)      ROS: Review of Systems   Constitutional:  Positive for fatigue.   HENT: Negative.     Eyes: Negative.    Respiratory: Negative.     Cardiovascular: Negative.    Gastrointestinal:  Positive for abdominal pain and diarrhea.   Endocrine: Negative.    Genitourinary: Negative.    Musculoskeletal: Negative.    Skin: Negative.    Allergic/Immunologic: Negative.    Neurological:  Positive for weakness.   Hematological: Negative.    Psychiatric/Behavioral: Negative.       PAST MED HX:  Past Medical History:   Diagnosis Date    Abdominal pain     Awareness under anesthesia     with colonoscopy    Blood in stool     Kidney stones     x3    Rectal bleeding     Ulcerative colitis     Wears glasses     for distance only-driving       PAST SURG HX:  Past Surgical History:   Procedure Laterality Date    COLONOSCOPY       "COLONOSCOPY N/A 10/28/2020    Procedure: COLONOSCOPY WITH BIOPSY;  Surgeon: Manjula Merrill MD;  [facility] ENDOSCOPY;  Service: Gastroenterology;  Laterality: N/A;    VAGINAL DELIVERY      x4       FAM HX:  Family History   Problem Relation Age of Onset    Heart disease Father     Cancer Maternal Aunt     Diabetes Maternal Grandmother     Heart disease Maternal Grandmother        SOC HX:  Social History     Socioeconomic History    Marital status:    Tobacco Use    Smoking status: Former     Packs/day: 2.00     Types: Cigarettes     Quit date:      Years since quittin.    Smokeless tobacco: Never   Vaping Use    Vaping Use: Never used   Substance and Sexual Activity    Alcohol use: Not Currently     Comment: quit in     Drug use: Never    Sexual activity: Defer     PHYSICAL EXAM  /85   Pulse 82   Temp 98.7 øF (37.1 øC) (Oral)   Resp 18   Ht 154.9 cm (61\")   Wt 84.6 kg (186 lb 8.2 oz)   SpO2 97%   BMI 35.24 kg/mý   Wt Readings from Last 3 Encounters:   23 84.6 kg (186 lb 8.2 oz)   20 78.5 kg (173 lb)   10/28/20 78.6 kg (173 lb 4 oz)   ,body mass index is 35.24 kg/mý.  Physical Exam  Constitutional:       General: She is not in acute distress.  HENT:      Head: Normocephalic and atraumatic.      Mouth/Throat:      Mouth: Mucous membranes are moist.   Eyes:      General: No scleral icterus.  Cardiovascular:      Rate and Rhythm: Normal rate and regular rhythm.   Pulmonary:      Effort: Pulmonary effort is normal. No respiratory distress.   Abdominal:      General: Bowel sounds are normal. There is no distension.      Palpations: Abdomen is soft.      Tenderness: There is abdominal tenderness.   Musculoskeletal:         General: Normal range of motion.      Right lower leg: No edema.      Left lower leg: No edema.   Neurological:      Mental Status: She is alert and oriented to person, place, and time.   Psychiatric:         Behavior: Behavior normal.         Thought " Content: Thought content normal.     Results Review:   I reviewed the patient's new clinical results.    Lab Results   Component Value Date    WBC 6.25 07/28/2023    HGB 11.2 (L) 07/28/2023    HCT 35.8 07/28/2023    MCV 80.8 07/28/2023     07/28/2023     No results found for: INR    Lab Results   Component Value Date    GLUCOSE 90 07/28/2023    BUN 4 (L) 07/28/2023    CREATININE 0.50 (L) 07/28/2023    BCR 8.0 07/28/2023     07/28/2023    K 4.0 07/28/2023    CO2 24.0 07/28/2023    CALCIUM 8.5 (L) 07/28/2023    ALBUMIN 3.5 07/28/2023    ALKPHOS 70 07/28/2023    BILITOT 0.3 07/28/2023    ALT 12 07/28/2023    AST 17 07/28/2023     Toxigenic C. difficile by PCR  Not Detected Not Detected     GI Panel PCR       Component  Ref Range & Units    Campylobacter  Not Detected Not Detected   Plesiomonas shigelloides  Not Detected Not Detected   Salmonella  Not Detected Not Detected   Vibrio  Not Detected Not Detected   Vibrio cholerae  Not Detected Not Detected   Yersinia enterocolitica  Not Detected Not Detected   Enteroaggregative E. coli (EAEC)  Not Detected Not Detected   Enteropathogenic E. coli (EPEC)  Not Detected Not Detected   Enterotoxigenic E. coli (ETEC) lt/st  Not Detected Not Detected   Shiga-like toxin-producing E. coli (STEC) stx1/stx2  Not Detected Not Detected   Shigella/Enteroinvasive E. coli (EIEC)  Not Detected Not Detected   Cryptosporidium  Not Detected Not Detected   Cyclospora cayetanensis  Not Detected Not Detected   Entamoeba histolytica  Not Detected Not Detected   Giardia lamblia  Not Detected Not Detected   Adenovirus F40/41  Not Detected Not Detected   Astrovirus  Not Detected Not Detected   Norovirus GI/GII  Not Detected Not Detected   Rotavirus A  Not Detected Not Detected   Sapovirus (I, II, IV or V)  Not Detected Not Detected        CT Abdomen/Pelvis (as interpreted by radiologist)  Findings:   Limited evaluation of the lung bases demonstrate no gross abnormality.   The liver is  unremarkable.   There are no radio opaque gallbladder calculi.   The spleen is enlarged measuring 12.3 cm.   7The pancreases is unremarkable.   There are no adrenal nodules.   The kidneys are within normal limits.   There is no small bowel obstruction .   The appendix is unremarkable.   There is diffuse wall thickening of the colon with pericolonic stranding from the cecum through the rectum consistent with a pancolitis.    The urinary bladder is unremarkable.  The uterus is unremarkable.   There is no pelvic free fluid.   The osseous structures are within normal limits.   IMPESSION :   Pancolitis with wall thickening pericolonic stranding from the cecum to the rectum.   No evidence of bowel obstruction.   Splenomegaly.    ASSESSMENTS/PLANS    Ulcerative colitis with exacerbation   Normocytic anemia, due to blood loss     C. Difficile toxin and GI panel PCR are negative for infectious source.       >> Recommend colonoscopy tomorrow  >> Obtain CRP and fecal caloprotectin   >> Will obtain urine histo, Hepatitis B Ag and TB screen in anticipation of need of biologic therapy   >> Bentyl 10 mg QID     I discussed the patient's findings and my recommendations with patient, her family members whom are present at the bedside and Dr. Goode.       CHENCHO Malone  07/28/23  15:07 EDT

## 2023-07-28 NOTE — ANESTHESIA PREPROCEDURE EVALUATION
Anesthesia Evaluation     Patient summary reviewed and Nursing notes reviewed   no history of anesthetic complications:   NPO Solid Status: > 8 hours  NPO Liquid Status: > 2 hours           Airway   Mallampati: II  TM distance: >3 FB  Neck ROM: full  No difficulty expected  Dental - normal exam     Pulmonary - normal exam   (+) a smoker Former,  Cardiovascular - normal exam    ECG reviewed        Neuro/Psych- negative ROS  GI/Hepatic/Renal/Endo    (+) obesity, renal disease stones    ROS Comment: UC exacerbation  Abdominal pain/diarrhea    Musculoskeletal (-) negative ROS    Abdominal    Substance History      OB/GYN          Other - negative ROS                     Anesthesia Plan    ASA 2     general     (propofol)  intravenous induction     Anesthetic plan, risks, benefits, and alternatives have been provided, discussed and informed consent has been obtained with: patient.    Plan discussed with CRNA.    CODE STATUS:    Code Status (Patient has no pulse and is not breathing): CPR (Attempt to Resuscitate)  Medical Interventions (Patient has pulse or is breathing): Full Support  Release to patient: Routine Release

## 2023-07-28 NOTE — H&P
[facility] Medicine Services  HISTORY AND PHYSICAL    Patient Name: Maria Victoria Pena  : 1975  MRN: [id number]  Primary Care Physician: Phi Snider PA  Date of admission: 2023      Subjective   Subjective     Chief Complaint:  Abd pain, diarrhea     HPI:  Maria Victoria Pena is a 47 y.o. female with history of ulcerative colitis diagnosed 19 years ago who presents with diffuse abd pain and bloody diarrhea. Started in  and improved with steroids. She then started zoloft and thinks this made things worse.  States she has had about 100 BM per day and 30 BM per night.  Diffuse cramping pain. Subjective fevers. Last c-scope about 2 years ago. + nausea and vomiting. Poor PO intake over the last 3 months.  with a terminal diagnosis causing significant stress since March.     Review of Systems   Gen- No fevers, chills  CV- No chest pain, palpitations  Resp- No cough, dyspnea  GI- + N/V/D, abd pain    Personal History     Past Medical History:   Diagnosis Date    Abdominal pain     Awareness under anesthesia     with colonoscopy    Blood in stool     Kidney stones     x3    Rectal bleeding     Ulcerative colitis     Wears glasses     for distance only-driving             Past Surgical History:   Procedure Laterality Date    COLONOSCOPY      COLONOSCOPY N/A 10/28/2020    Procedure: COLONOSCOPY WITH BIOPSY;  Surgeon: Manjula Merrill MD;  Location: [facility] ENDOSCOPY;  Service: Gastroenterology;  Laterality: N/A;    VAGINAL DELIVERY      x4       Family History: family history includes Cancer in her maternal aunt; Diabetes in her maternal grandmother; Heart disease in her father and maternal grandmother.     Social History:  reports that she quit smoking about 23 years ago. Her smoking use included cigarettes. She smoked an average of 2 packs per day. She has never used smokeless tobacco. She reports that she does not currently use alcohol. She reports that she does not use  drugs.  Social History     Social History Narrative    Not on file       Medications:  Available home medication information reviewed.  (Not in a hospital admission)      No Known Allergies    Objective   Objective     Vital Signs:   Temp:  [98.7 øF (37.1 øC)] 98.7 øF (37.1 øC)  Heart Rate:  [82-99] 82  Resp:  [18] 18  BP: (114-132)/(76-86) 128/85       Physical Exam   Constitutional: Awake, alert  Eyes: PERRLA, sclerae anicteric, no conjunctival injection  HENT: NCAT, mucous membranes moist  Neck: Supple, no thyromegaly, no lymphadenopathy, trachea midline  Respiratory: Clear to auscultation bilaterally, nonlabored respirations   Cardiovascular: RRR, no murmurs, rubs, or gallops, palpable pedal pulses bilaterally  Gastrointestinal: Positive bowel sounds, soft, diffuse tenderness, no guarding/rebound  Musculoskeletal: No bilateral ankle edema, no clubbing or cyanosis to extremities  Psychiatric: Appropriate affect, cooperative  Neurologic: Oriented x 3, strength symmetric in all extremities, Cranial Nerves grossly intact to confrontation, speech clear  Skin: No rashes      Result Review:  I have personally reviewed the results from the time of this admission to 7/28/2023 15:20 EDT and agree with these findings:  []  Laboratory list / accordion  []  Microbiology  []  Radiology  []  EKG/Telemetry   []  Cardiology/Vascular   []  Pathology  []  Old records  []  Other:  Most notable findings include:         LAB RESULTS:      Lab 07/28/23  1246 07/28/23  1158   WBC 6.25  --    HEMOGLOBIN 11.2*  --    HEMATOCRIT 35.8  --    PLATELETS 253  --    NEUTROS ABS 3.52  --    IMMATURE GRANS (ABS) 0.04  --    LYMPHS ABS 1.42  --    MONOS ABS 0.80  --    EOS ABS 0.42*  --    MCV 80.8  --    SED RATE 34*  --    PROCALCITONIN  --  0.08   LACTATE  --  0.9         Lab 07/28/23  1158   SODIUM 140   POTASSIUM 4.0   CHLORIDE 105   CO2 24.0   ANION GAP 11.0   BUN 4*   CREATININE 0.50*   EGFR 118.0   GLUCOSE 90   CALCIUM 8.5*         Lab  07/28/23  1158   TOTAL PROTEIN 6.3   ALBUMIN 3.5   GLOBULIN 2.8   ALT (SGPT) 12   AST (SGOT) 17   BILIRUBIN 0.3   ALK PHOS 70                         Microbiology Results (last 10 days)       Procedure Component Value - Date/Time    Gastrointestinal Panel, PCR - Stool, Per Rectum [[id number]]  (Normal) Collected: 07/28/23 1240    Lab Status: Final result Specimen: Stool from Per Rectum Updated: 07/28/23 1434     Campylobacter Not Detected     Plesiomonas shigelloides Not Detected     Salmonella Not Detected     Vibrio Not Detected     Vibrio cholerae Not Detected     Yersinia enterocolitica Not Detected     Enteroaggregative E. coli (EAEC) Not Detected     Enteropathogenic E. coli (EPEC) Not Detected     Enterotoxigenic E. coli (ETEC) lt/st Not Detected     Shiga-like toxin-producing E. coli (STEC) stx1/stx2 Not Detected     Shigella/Enteroinvasive E. coli (EIEC) Not Detected     Cryptosporidium Not Detected     Cyclospora cayetanensis Not Detected     Entamoeba histolytica Not Detected     Giardia lamblia Not Detected     Adenovirus F40/41 Not Detected     Astrovirus Not Detected     Norovirus GI/GII Not Detected     Rotavirus A Not Detected     Sapovirus (I, II, IV or V) Not Detected    Clostridioides difficile Toxin - Stool, Per Rectum [[id number]]  (Normal) Collected: 07/28/23 1240    Lab Status: Final result Specimen: Stool from Per Rectum Updated: 07/28/23 1406    Narrative:      The following orders were created for panel order Clostridioides difficile Toxin - Stool, Per Rectum.  Procedure                               Abnormality         Status                     ---------                               -----------         ------                     Clostridioides difficile...[[id number]]  Normal              Final result                 Please view results for these tests on the individual orders.    Clostridioides difficile Toxin, PCR - Stool, Per Rectum [[id number]]  (Normal) Collected: 07/28/23 1240    Lab  Status: Final result Specimen: Stool from Per Rectum Updated: 07/28/23 1406     Toxigenic C. difficile by PCR Not Detected    Narrative:      The result indicates the absence of toxigenic C. difficile from stool specimen.             No radiology results from the last 24 hrs        Assessment & Plan   Assessment & Plan     Active Hospital Problems    Diagnosis  POA    **Bloody diarrhea [R19.7]  Yes    Splenomegaly [R16.1]  Unknown    Pancolitis [K51.00]  Unknown    Ulcerative colitis with rectal bleeding [K51.911]  Yes     Added automatically from request for surgery [id number]         Maria Victoria Pena is a 47 y.o. female with history of ulcerative colitis diagnosed 19 years ago who presents with diffuse abd pain and bloody diarrhea. GI PCR and cdiff negative. CT abd/pel with pancolitis with wall thickening and stranding from cecum to rectum.     Ulcerative colitis  Blood diarrhea  - Imaging per above  - No history of maintenance treatment for colitis. History of steroid use about once per year  - GI consulted -- follow up recs   - C-scope planned for 7/29  - PRN pain control  - IVF   - Clear liquid diet; NPO at midnight    DVT prophylaxis:  SCDs      CODE STATUS:    Code Status and Medical Interventions:   Ordered at: 07/28/23 1534     Code Status (Patient has no pulse and is not breathing):    CPR (Attempt to Resuscitate)     Medical Interventions (Patient has pulse or is breathing):    Full Support     Release to patient:    Routine Release       Expected Discharge   Expected discharge date/ time has not been documented.     Maria De Jesus Goode DO  07/28/23

## 2023-07-28 NOTE — Clinical Note
Level of Care: Telemetry [5]   Diagnosis: Bloody diarrhea [279878]   Certification: I Certify That Inpatient Hospital Services Are Medically Necessary For Greater Than 2 Midnights

## 2023-07-28 NOTE — ED PROVIDER NOTES
Subjective   History of Present Illness  This is a very pleasant 47-year-old female is accompanied by her mother and sister.  Her primary care provider is Delphine Snider at [facility] in [place].  20 years ago she saw Dr. Mei CANALES.  Most recently in 2020 she saw Dr. Abebe in [place] for GI issues.  She is a homemaker.    She has a history of what sounds like ulcerative colitis diagnosed about 19 or 20 years ago when she was pregnant.  She has had colonoscopies then and her most recent colonoscopy at [facility] in 2020 showed chronic colitis but I have no other information.  I do not have any results of a [employer] test.    She is never been on any biologic agents that she can recall that she has had a flare for the past 2 or 3 weeks where she is having copious amounts of bloody diarrhea.  She saw her PCP about 2 weeks ago was started on a steroid taper which has not helped.  She thinks some of this may have been exacerbated by Zoloft that she started early in July as her  has terminal cancer and is at home.    She has had no recent travels.  No unpasteurized dairy product ingestions.  No well water.  No recent antibiotics.    Her weights been fairly stable.  P.o. intake has been decreased.  No fever but some chills.  She is perimenopausal still having periods she has had a tubal ligation in the past denies current pregnancy.  She has had no dysuria.  In general she just feels miserable and weak.  Her stools are very frequent multiple times a day and she is having some fecal incontinence that she cannot make it to the bathroom in time.        All other systems reviewed and are negative except as noted above.      Review of Systems   All other systems reviewed and are negative.    Past Medical History:   Diagnosis Date    Abdominal pain     Awareness under anesthesia     with colonoscopy    Blood in stool     Kidney stones     x3    Rectal bleeding     Ulcerative colitis     Wears glasses     for  distance only-driving       No Known Allergies    Past Surgical History:   Procedure Laterality Date    COLONOSCOPY      COLONOSCOPY N/A 10/28/2020    Procedure: COLONOSCOPY WITH BIOPSY;  Surgeon: Manjula Merrill MD;  Location: [facility] ENDOSCOPY;  Service: Gastroenterology;  Laterality: N/A;    VAGINAL DELIVERY      x4       Family History   Problem Relation Age of Onset    Heart disease Father     Cancer Maternal Aunt     Diabetes Maternal Grandmother     Heart disease Maternal Grandmother        Social History     Socioeconomic History    Marital status:    Tobacco Use    Smoking status: Former     Packs/day: 2.00     Types: Cigarettes     Quit date:      Years since quittin.5    Smokeless tobacco: Never   Vaping Use    Vaping Use: Never used   Substance and Sexual Activity    Alcohol use: Not Currently     Comment: quit in     Drug use: Never    Sexual activity: Defer           Objective   Physical Exam  Vitals and nursing note reviewed.   Constitutional:       Comments: This is a pleasant 47-year-old alert and oriented GCS is 15.  She looks uncomfortable.   HENT:      Head: Normocephalic and atraumatic.      Right Ear: External ear normal.      Left Ear: External ear normal.      Nose: Nose normal.      Mouth/Throat:      Mouth: Mucous membranes are moist.      Pharynx: Oropharynx is clear.   Eyes:      Extraocular Movements: Extraocular movements intact.      Conjunctiva/sclera: Conjunctivae normal.      Pupils: Pupils are equal, round, and reactive to light.   Cardiovascular:      Rate and Rhythm: Normal rate and regular rhythm.      Pulses: Normal pulses.      Heart sounds: Normal heart sounds.   Pulmonary:      Effort: Pulmonary effort is normal.      Breath sounds: Normal breath sounds.   Abdominal:      Comments: BMI 35 positive bowel sounds soft with modest diffuse tenderness without organomegaly, masses, or guarding.   Genitourinary:     Comments: Provided a stool specimen which I  examined.  Is brown to maroonish consistent with blood in diarrheal nature  Musculoskeletal:         General: Normal range of motion.      Cervical back: Normal range of motion and neck supple.      Comments: Equal pulses without edema, synovitis, rash, or venous cords.   Skin:     General: Skin is warm and dry.      Capillary Refill: Capillary refill takes less than 2 seconds.   Neurological:      Mental Status: She is alert.      Comments: Asymmetric, voice strong, tongue midline.  Vision, hearing, speech preserved.  Mild generalized weakness without focality.       Procedures           ED Course           Recent Results (from the past 24 hour(s))   Comprehensive Metabolic Panel    Collection Time: 07/28/23 11:58 AM    Specimen: Blood   Result Value Ref Range    Glucose 90 65 - 99 mg/dL    BUN 4 (L) 6 - 20 mg/dL    Creatinine 0.50 (L) 0.57 - 1.00 mg/dL    Sodium 140 136 - 145 mmol/L    Potassium 4.0 3.5 - 5.2 mmol/L    Chloride 105 98 - 107 mmol/L    CO2 24.0 22.0 - 29.0 mmol/L    Calcium 8.5 (L) 8.6 - 10.5 mg/dL    Total Protein 6.3 6.0 - 8.5 g/dL    Albumin 3.5 3.5 - 5.2 g/dL    ALT (SGPT) 12 1 - 33 U/L    AST (SGOT) 17 1 - 32 U/L    Alkaline Phosphatase 70 39 - 117 U/L    Total Bilirubin 0.3 0.0 - 1.2 mg/dL    Globulin 2.8 gm/dL    A/G Ratio 1.3 g/dL    BUN/Creatinine Ratio 8.0 7.0 - 25.0    Anion Gap 11.0 5.0 - 15.0 mmol/L    eGFR 118.0 >60.0 mL/min/1.73   Lactic Acid, Plasma    Collection Time: 07/28/23 11:58 AM    Specimen: Blood   Result Value Ref Range    Lactate 0.9 0.5 - 2.0 mmol/L   Procalcitonin    Collection Time: 07/28/23 11:58 AM    Specimen: Blood   Result Value Ref Range    Procalcitonin 0.08 0.00 - 0.25 ng/mL   Gastrointestinal Panel, PCR - Stool, Per Rectum    Collection Time: 07/28/23 12:40 PM    Specimen: Per Rectum; Stool   Result Value Ref Range    Campylobacter Not Detected Not Detected    Plesiomonas shigelloides Not Detected Not Detected    Salmonella Not Detected Not Detected    Vibrio  Not Detected Not Detected    Vibrio cholerae Not Detected Not Detected    Yersinia enterocolitica Not Detected Not Detected    Enteroaggregative E. coli (EAEC) Not Detected Not Detected    Enteropathogenic E. coli (EPEC) Not Detected Not Detected    Enterotoxigenic E. coli (ETEC) lt/st Not Detected Not Detected    Shiga-like toxin-producing E. coli (STEC) stx1/stx2 Not Detected Not Detected    Shigella/Enteroinvasive E. coli (EIEC) Not Detected Not Detected    Cryptosporidium Not Detected Not Detected    Cyclospora cayetanensis Not Detected Not Detected    Entamoeba histolytica Not Detected Not Detected    Giardia lamblia Not Detected Not Detected    Adenovirus F40/41 Not Detected Not Detected    Astrovirus Not Detected Not Detected    Norovirus GI/GII Not Detected Not Detected    Rotavirus A Not Detected Not Detected    Sapovirus (I, II, IV or V) Not Detected Not Detected   Occult Blood X 1, Stool - Stool, Per Rectum    Collection Time: 07/28/23 12:40 PM    Specimen: Per Rectum; Stool   Result Value Ref Range    Fecal Occult Blood Positive (A) Negative   Clostridioides difficile Toxin, PCR - Stool, Per Rectum    Collection Time: 07/28/23 12:40 PM    Specimen: Per Rectum; Stool   Result Value Ref Range    Toxigenic C. difficile by PCR Not Detected Not Detected   Sedimentation Rate    Collection Time: 07/28/23 12:46 PM    Specimen: Arm, Left; Blood   Result Value Ref Range    Sed Rate 34 (H) 0 - 20 mm/hr   CBC Auto Differential    Collection Time: 07/28/23 12:46 PM    Specimen: Arm, Left; Blood   Result Value Ref Range    WBC 6.25 3.40 - 10.80 10*3/mm3    RBC 4.43 3.77 - 5.28 10*6/mm3    Hemoglobin 11.2 (L) 12.0 - 15.9 g/dL    Hematocrit 35.8 34.0 - 46.6 %    MCV 80.8 79.0 - 97.0 fL    MCH 25.3 (L) 26.6 - 33.0 pg    MCHC 31.3 (L) 31.5 - 35.7 g/dL    RDW 14.6 12.3 - 15.4 %    RDW-SD 42.7 37.0 - 54.0 fl    MPV 10.5 6.0 - 12.0 fL    Platelets 253 140 - 450 10*3/mm3    Neutrophil % 56.4 42.7 - 76.0 %    Lymphocyte % 22.7  19.6 - 45.3 %    Monocyte % 12.8 (H) 5.0 - 12.0 %    Eosinophil % 6.7 (H) 0.3 - 6.2 %    Basophil % 0.8 0.0 - 1.5 %    Immature Grans % 0.6 (H) 0.0 - 0.5 %    Neutrophils, Absolute 3.52 1.70 - 7.00 10*3/mm3    Lymphocytes, Absolute 1.42 0.70 - 3.10 10*3/mm3    Monocytes, Absolute 0.80 0.10 - 0.90 10*3/mm3    Eosinophils, Absolute 0.42 (H) 0.00 - 0.40 10*3/mm3    Basophils, Absolute 0.05 0.00 - 0.20 10*3/mm3    Immature Grans, Absolute 0.04 0.00 - 0.05 10*3/mm3    nRBC 0.0 0.0 - 0.2 /100 WBC   hCG, Quantitative, Pregnancy    Collection Time: 07/28/23  2:28 PM    Specimen: Blood   Result Value Ref Range    HCG Quantitative <0.10 mIU/mL     Note: In addition to lab results from this visit, the labs listed above may include labs taken at another facility or during a different encounter within the last 24 hours. Please correlate lab times with ED admission and discharge times for further clarification of the services performed during this visit.    CT Abdomen Pelvis With Contrast   Final Result        Vitals:    07/28/23 1246 07/28/23 1400 07/28/23 1430 07/28/23 1530   BP:  132/78 128/85 127/90   BP Location:       Patient Position:       Pulse: 96 87 82 90   Resp:       Temp:       TempSrc:       SpO2: 99% 97% 97% 98%   Weight:       Height:         Medications   sodium chloride 0.9 % flush 10 mL (has no administration in time range)   morphine injection 2 mg (2 mg Intravenous Given 7/28/23 1544)   HYDROcodone-acetaminophen (NORCO) 5-325 MG per tablet 1 tablet (1 tablet Oral Given 7/28/23 1544)   sodium chloride 0.9 % infusion (has no administration in time range)   lactated ringers bolus 1,434 mL (0 mL Intravenous Stopped 7/28/23 1453)   ondansetron (ZOFRAN) injection 4 mg (4 mg Intravenous Given 7/28/23 1307)   HYDROmorphone (DILAUDID) injection 0.25 mg (0.25 mg Intravenous Given 7/28/23 1308)   iopamidol (ISOVUE-300) 61 % injection 100 mL (85 mL Intravenous Given 7/28/23 1510)     ECG/EMG Results (last 24 hours)        ** No results found for the last 24 hours. **          No orders to display                                       Medical Decision Making        I have reviewed all available studies at the bedside with the patient and her family.  Her labs are fairly bland save for a sedimentation rate of 34.  CT scan of the abdomen pelvis with IV contrast pending at the time of admission.    This lady with a history of what sounds like ulcerative colitis but the last pathology I see from her from 2020 showed chronic colitis, not sure if this equates with ulcerative colitis or if she could have another pathology present.    Regardless she has exacerbation of her symptoms with blood in her stools has been refractory to outpatient treatment with steroids.    This point I think is reasonable to admit her to the hospital to see if she will respond to some IV agents and I spoke with Dr. yDer, on-call GI and she will see the patient in consultation and I contacted Dr. Jensen and he and his colleagues will admit the patient.    Patient was hydrated here and given IV pain medication and antiemetics.  Stool studies including C. difficile and PCR pending at the time of admission.    All are agreeable with the plan    Problems Addressed:  Generalized abdominal pain: complicated acute illness or injury with systemic symptoms that poses a threat to life or bodily functions  Hematochezia: complicated acute illness or injury with systemic symptoms that poses a threat to life or bodily functions  History of ulcerative colitis: chronic illness or injury  Sedimentation rate elevation: undiagnosed new problem with uncertain prognosis    Amount and/or Complexity of Data Reviewed  Independent Historian: parent  External Data Reviewed: labs and notes.  Labs: ordered. Decision-making details documented in ED Course.  Radiology: ordered and independent interpretation performed. Decision-making details documented in ED Course.    Risk  Prescription drug  management.  Parenteral controlled substances.  Decision regarding hospitalization.        Final diagnoses:   Hematochezia   Generalized abdominal pain   History of ulcerative colitis   Sedimentation rate elevation       ED Disposition  ED Disposition       ED Disposition   Decision to Admit    Condition   --    Comment   Level of Care: Telemetry [5]   Diagnosis: Bloody diarrhea [338614]                 No follow-up provider specified.       Medication List      No changes were made to your prescriptions during this visit.            Hari Romero MD  07/28/23 5912

## 2023-07-29 ENCOUNTER — ANESTHESIA (OUTPATIENT)
Dept: GASTROENTEROLOGY | Facility: HOSPITAL | Age: 48
DRG: 387 | End: 2023-07-29
Payer: COMMERCIAL

## 2023-07-29 LAB
ALBUMIN SERPL-MCNC: 3.4 G/DL (ref 3.5–5.2)
ALBUMIN/GLOB SERPL: 1.2 G/DL
ALP SERPL-CCNC: 63 U/L (ref 39–117)
ALT SERPL W P-5'-P-CCNC: 12 U/L (ref 1–33)
ANION GAP SERPL CALCULATED.3IONS-SCNC: 10 MMOL/L (ref 5–15)
AST SERPL-CCNC: 15 U/L (ref 1–32)
BASOPHILS # BLD AUTO: 0.06 10*3/MM3 (ref 0–0.2)
BASOPHILS NFR BLD AUTO: 0.8 % (ref 0–1.5)
BILIRUB SERPL-MCNC: 0.3 MG/DL (ref 0–1.2)
BUN SERPL-MCNC: 4 MG/DL (ref 6–20)
BUN/CREAT SERPL: 8.3 (ref 7–25)
CALCIUM SPEC-SCNC: 8.2 MG/DL (ref 8.6–10.5)
CHLORIDE SERPL-SCNC: 103 MMOL/L (ref 98–107)
CO2 SERPL-SCNC: 24 MMOL/L (ref 22–29)
CREAT SERPL-MCNC: 0.48 MG/DL (ref 0.57–1)
DEPRECATED RDW RBC AUTO: 42.7 FL (ref 37–54)
EGFRCR SERPLBLD CKD-EPI 2021: 119.2 ML/MIN/1.73
EOSINOPHIL # BLD AUTO: 0.5 10*3/MM3 (ref 0–0.4)
EOSINOPHIL NFR BLD AUTO: 7 % (ref 0.3–6.2)
ERYTHROCYTE [DISTWIDTH] IN BLOOD BY AUTOMATED COUNT: 14.6 % (ref 12.3–15.4)
GLOBULIN UR ELPH-MCNC: 2.8 GM/DL
GLUCOSE SERPL-MCNC: 92 MG/DL (ref 65–99)
HCT VFR BLD AUTO: 34.8 % (ref 34–46.6)
HGB BLD-MCNC: 10.9 G/DL (ref 12–15.9)
IMM GRANULOCYTES # BLD AUTO: 0.04 10*3/MM3 (ref 0–0.05)
IMM GRANULOCYTES NFR BLD AUTO: 0.6 % (ref 0–0.5)
LYMPHOCYTES # BLD AUTO: 1.32 10*3/MM3 (ref 0.7–3.1)
LYMPHOCYTES NFR BLD AUTO: 18.5 % (ref 19.6–45.3)
MCH RBC QN AUTO: 25.3 PG (ref 26.6–33)
MCHC RBC AUTO-ENTMCNC: 31.3 G/DL (ref 31.5–35.7)
MCV RBC AUTO: 80.9 FL (ref 79–97)
MONOCYTES # BLD AUTO: 0.77 10*3/MM3 (ref 0.1–0.9)
MONOCYTES NFR BLD AUTO: 10.8 % (ref 5–12)
NEUTROPHILS NFR BLD AUTO: 4.46 10*3/MM3 (ref 1.7–7)
NEUTROPHILS NFR BLD AUTO: 62.3 % (ref 42.7–76)
NRBC BLD AUTO-RTO: 0 /100 WBC (ref 0–0.2)
PLATELET # BLD AUTO: 252 10*3/MM3 (ref 140–450)
PMV BLD AUTO: 10.6 FL (ref 6–12)
POTASSIUM SERPL-SCNC: 3.6 MMOL/L (ref 3.5–5.2)
PROT SERPL-MCNC: 6.2 G/DL (ref 6–8.5)
RBC # BLD AUTO: 4.3 10*6/MM3 (ref 3.77–5.28)
SODIUM SERPL-SCNC: 137 MMOL/L (ref 136–145)
WBC NRBC COR # BLD: 7.15 10*3/MM3 (ref 3.4–10.8)

## 2023-07-29 PROCEDURE — 0DBE8ZX EXCISION OF LARGE INTESTINE, VIA NATURAL OR ARTIFICIAL OPENING ENDOSCOPIC, DIAGNOSTIC: ICD-10-PCS | Performed by: INTERNAL MEDICINE

## 2023-07-29 PROCEDURE — 25010000002 PROPOFOL 10 MG/ML EMULSION

## 2023-07-29 PROCEDURE — 25010000002 MORPHINE PER 10 MG: Performed by: INTERNAL MEDICINE

## 2023-07-29 PROCEDURE — 99232 SBSQ HOSP IP/OBS MODERATE 35: CPT | Performed by: INTERNAL MEDICINE

## 2023-07-29 PROCEDURE — 63710000001 ONDANSETRON PER 8 MG

## 2023-07-29 PROCEDURE — 81377 HLA II TYPE 1 AG EQUIV LR: CPT | Performed by: INTERNAL MEDICINE

## 2023-07-29 PROCEDURE — 63710000001 ONDANSETRON PER 8 MG: Performed by: INTERNAL MEDICINE

## 2023-07-29 PROCEDURE — 88305 TISSUE EXAM BY PATHOLOGIST: CPT | Performed by: INTERNAL MEDICINE

## 2023-07-29 PROCEDURE — 93005 ELECTROCARDIOGRAM TRACING: CPT | Performed by: INTERNAL MEDICINE

## 2023-07-29 PROCEDURE — 93010 ELECTROCARDIOGRAM REPORT: CPT | Performed by: INTERNAL MEDICINE

## 2023-07-29 PROCEDURE — 80053 COMPREHEN METABOLIC PANEL: CPT | Performed by: INTERNAL MEDICINE

## 2023-07-29 PROCEDURE — 85025 COMPLETE CBC W/AUTO DIFF WBC: CPT | Performed by: INTERNAL MEDICINE

## 2023-07-29 PROCEDURE — 45380 COLONOSCOPY AND BIOPSY: CPT | Performed by: INTERNAL MEDICINE

## 2023-07-29 PROCEDURE — 25010000002 HYDROCORTISONE SOD SUC (PF) 100 MG RECONSTITUTED SOLUTION 1 EACH VIAL: Performed by: NURSE PRACTITIONER

## 2023-07-29 RX ORDER — IPRATROPIUM BROMIDE AND ALBUTEROL SULFATE 2.5; .5 MG/3ML; MG/3ML
3 SOLUTION RESPIRATORY (INHALATION) ONCE AS NEEDED
Status: DISCONTINUED | OUTPATIENT
Start: 2023-07-29 | End: 2023-07-29 | Stop reason: HOSPADM

## 2023-07-29 RX ORDER — LIDOCAINE HYDROCHLORIDE 10 MG/ML
INJECTION, SOLUTION EPIDURAL; INFILTRATION; INTRACAUDAL; PERINEURAL AS NEEDED
Status: DISCONTINUED | OUTPATIENT
Start: 2023-07-29 | End: 2023-07-29 | Stop reason: SURG

## 2023-07-29 RX ORDER — ONDANSETRON 4 MG/1
4 TABLET, FILM COATED ORAL EVERY 6 HOURS PRN
Status: DISCONTINUED | OUTPATIENT
Start: 2023-07-29 | End: 2023-08-02 | Stop reason: HOSPADM

## 2023-07-29 RX ORDER — ONDANSETRON 2 MG/ML
4 INJECTION INTRAMUSCULAR; INTRAVENOUS EVERY 6 HOURS PRN
Status: DISCONTINUED | OUTPATIENT
Start: 2023-07-29 | End: 2023-08-02 | Stop reason: HOSPADM

## 2023-07-29 RX ORDER — SIMETHICONE 80 MG
80 TABLET,CHEWABLE ORAL 4 TIMES DAILY PRN
Status: DISCONTINUED | OUTPATIENT
Start: 2023-07-29 | End: 2023-08-02 | Stop reason: HOSPADM

## 2023-07-29 RX ORDER — PROPOFOL 10 MG/ML
VIAL (ML) INTRAVENOUS AS NEEDED
Status: DISCONTINUED | OUTPATIENT
Start: 2023-07-29 | End: 2023-07-29 | Stop reason: SURG

## 2023-07-29 RX ORDER — SODIUM CHLORIDE, SODIUM LACTATE, POTASSIUM CHLORIDE, CALCIUM CHLORIDE 600; 310; 30; 20 MG/100ML; MG/100ML; MG/100ML; MG/100ML
INJECTION, SOLUTION INTRAVENOUS CONTINUOUS PRN
Status: DISCONTINUED | OUTPATIENT
Start: 2023-07-29 | End: 2023-07-29 | Stop reason: SURG

## 2023-07-29 RX ORDER — ONDANSETRON 2 MG/ML
4 INJECTION INTRAMUSCULAR; INTRAVENOUS ONCE AS NEEDED
Status: DISCONTINUED | OUTPATIENT
Start: 2023-07-29 | End: 2023-07-29 | Stop reason: HOSPADM

## 2023-07-29 RX ORDER — PROPOFOL 10 MG/ML
VIAL (ML) INTRAVENOUS CONTINUOUS PRN
Status: DISCONTINUED | OUTPATIENT
Start: 2023-07-29 | End: 2023-07-29 | Stop reason: SURG

## 2023-07-29 RX ADMIN — SODIUM CHLORIDE, POTASSIUM CHLORIDE, SODIUM LACTATE AND CALCIUM CHLORIDE: 600; 310; 30; 20 INJECTION, SOLUTION INTRAVENOUS at 07:43

## 2023-07-29 RX ADMIN — SIMETHICONE 80 MG: 80 TABLET, CHEWABLE ORAL at 20:29

## 2023-07-29 RX ADMIN — MORPHINE SULFATE 2 MG: 2 INJECTION, SOLUTION INTRAMUSCULAR; INTRAVENOUS at 03:48

## 2023-07-29 RX ADMIN — DICYCLOMINE HYDROCHLORIDE 10 MG: 10 CAPSULE ORAL at 09:04

## 2023-07-29 RX ADMIN — DICYCLOMINE HYDROCHLORIDE 10 MG: 10 CAPSULE ORAL at 12:07

## 2023-07-29 RX ADMIN — ONDANSETRON HYDROCHLORIDE 4 MG: 4 TABLET, FILM COATED ORAL at 20:29

## 2023-07-29 RX ADMIN — ONDANSETRON HYDROCHLORIDE 4 MG: 4 TABLET, FILM COATED ORAL at 05:56

## 2023-07-29 RX ADMIN — ACETAMINOPHEN 650 MG: 325 TABLET ORAL at 16:14

## 2023-07-29 RX ADMIN — PROPOFOL 150 MCG/KG/MIN: 10 INJECTION, EMULSION INTRAVENOUS at 07:49

## 2023-07-29 RX ADMIN — Medication 10 ML: at 09:09

## 2023-07-29 RX ADMIN — HYDROCODONE BITARTRATE AND ACETAMINOPHEN 1 TABLET: 5; 325 TABLET ORAL at 05:53

## 2023-07-29 RX ADMIN — DICYCLOMINE HYDROCHLORIDE 10 MG: 10 CAPSULE ORAL at 20:30

## 2023-07-29 RX ADMIN — LIDOCAINE HYDROCHLORIDE 50 MG: 10 INJECTION, SOLUTION EPIDURAL; INFILTRATION; INTRACAUDAL; PERINEURAL at 07:49

## 2023-07-29 RX ADMIN — Medication 10 ML: at 20:31

## 2023-07-29 RX ADMIN — Medication 40 MG: at 07:49

## 2023-07-29 RX ADMIN — SIMETHICONE 80 MG: 80 TABLET, CHEWABLE ORAL at 15:16

## 2023-07-29 RX ADMIN — DICYCLOMINE HYDROCHLORIDE 10 MG: 10 CAPSULE ORAL at 18:02

## 2023-07-29 RX ADMIN — MORPHINE SULFATE 2 MG: 2 INJECTION, SOLUTION INTRAMUSCULAR; INTRAVENOUS at 09:43

## 2023-07-29 RX ADMIN — SODIUM CHLORIDE 200 MG: 9 INJECTION, SOLUTION INTRAVENOUS at 09:41

## 2023-07-29 RX ADMIN — HYDROCODONE BITARTRATE AND ACETAMINOPHEN 1 TABLET: 5; 325 TABLET ORAL at 14:18

## 2023-07-29 RX ADMIN — ONDANSETRON HYDROCHLORIDE 4 MG: 4 TABLET, FILM COATED ORAL at 12:07

## 2023-07-29 RX ADMIN — HYDROCODONE BITARTRATE AND ACETAMINOPHEN 1 TABLET: 5; 325 TABLET ORAL at 20:29

## 2023-07-29 NOTE — PROGRESS NOTES
[facility]  PROGRESS NOTE    Patient Name: Maria Victoria Pena  : 1975  MRN: [id number]    Date of Admission: 2023  Primary Care Physician: No primary care provider on file.    Subjective   Subjective     CC:  Diarrhea     HPI:  No acute events. Cramping abd pain following colonoscopy today. Has been able to tolerate liquids.     ROS:  Gen- No fevers, chills  CV- No chest pain, palpitations  Resp- No cough, dyspnea  GI- + N/V/D, abd pain     Objective   Objective     Vital Signs:   Temp:  [97.3 øF (36.3 øC)-100 øF (37.8 øC)] 97.6 øF (36.4 øC)  Heart Rate:  [] 93  Resp:  [12-18] 18  BP: (101-131)/(55-90) 112/68     Physical Exam:  Constitutional: No acute distress, awake, alert  HENT: NCAT, mucous membranes moist  Respiratory: Clear to auscultation bilaterally, respiratory effort normal   Cardiovascular: RRR, no murmurs, rubs, or gallops  Gastrointestinal: Positive bowel sounds, soft, diffuse tenderness  Musculoskeletal: No bilateral ankle edema  Psychiatric: Appropriate affect, cooperative  Neurologic: Oriented x 3, strength symmetric in all extremities, Cranial Nerves grossly intact to confrontation, speech clear  Skin: No rashes      Results Reviewed:  LAB RESULTS:      Lab 23  0414 23  1428 23  1246 23  1158   WBC 7.15  --  6.25  --    HEMOGLOBIN 10.9*  --  11.2*  --    HEMATOCRIT 34.8  --  35.8  --    PLATELETS 252  --  253  --    NEUTROS ABS 4.46  --  3.52  --    IMMATURE GRANS (ABS) 0.04  --  0.04  --    LYMPHS ABS 1.32  --  1.42  --    MONOS ABS 0.77  --  0.80  --    EOS ABS 0.50*  --  0.42*  --    MCV 80.9  --  80.8  --    SED RATE  --   --  34*  --    CRP  --  2.76*  --   --    PROCALCITONIN  --   --   --  0.08   LACTATE  --   --   --  0.9         Lab 23  0414 23  1158   SODIUM 137 140   POTASSIUM 3.6 4.0   CHLORIDE 103 105   CO2 24.0 24.0   ANION GAP 10.0 11.0   BUN 4* 4*   CREATININE 0.48* 0.50*   EGFR 119.2  118.0   GLUCOSE 92 90   CALCIUM 8.2* 8.5*         Lab 07/29/23  0414 07/28/23  1158   TOTAL PROTEIN 6.2 6.3   ALBUMIN 3.4* 3.5   GLOBULIN 2.8 2.8   ALT (SGPT) 12 12   AST (SGOT) 15 17   BILIRUBIN 0.3 0.3   ALK PHOS 63 70                     Brief Urine Lab Results  (Last result in the past 365 days)        Color   Clarity   Blood   Leuk Est   Nitrite   Protein   CREAT   Urine HCG        07/28/23 1759 Yellow   Clear   Trace   Negative   Negative   Negative                   Microbiology Results Abnormal       Procedure Component Value - Date/Time    Gastrointestinal Panel, PCR - Stool, Per Rectum [[id number]]  (Normal) Collected: 07/28/23 1240    Lab Status: Final result Specimen: Stool from Per Rectum Updated: 07/28/23 1434     Campylobacter Not Detected     Plesiomonas shigelloides Not Detected     Salmonella Not Detected     Vibrio Not Detected     Vibrio cholerae Not Detected     Yersinia enterocolitica Not Detected     Enteroaggregative E. coli (EAEC) Not Detected     Enteropathogenic E. coli (EPEC) Not Detected     Enterotoxigenic E. coli (ETEC) lt/st Not Detected     Shiga-like toxin-producing E. coli (STEC) stx1/stx2 Not Detected     Shigella/Enteroinvasive E. coli (EIEC) Not Detected     Cryptosporidium Not Detected     Cyclospora cayetanensis Not Detected     Entamoeba histolytica Not Detected     Giardia lamblia Not Detected     Adenovirus F40/41 Not Detected     Astrovirus Not Detected     Norovirus GI/GII Not Detected     Rotavirus A Not Detected     Sapovirus (I, II, IV or V) Not Detected    Clostridioides difficile Toxin - Stool, Per Rectum [[id number]]  (Normal) Collected: 07/28/23 1240    Lab Status: Final result Specimen: Stool from Per Rectum Updated: 07/28/23 1406    Narrative:      The following orders were created for panel order Clostridioides difficile Toxin - Stool, Per Rectum.  Procedure                               Abnormality         Status                     ---------                                -----------         ------                     Clostridioides difficile...[[id number]]  Normal              Final result                 Please view results for these tests on the individual orders.    Clostridioides difficile Toxin, PCR - Stool, Per Rectum [[id number]]  (Normal) Collected: 07/28/23 1240    Lab Status: Final result Specimen: Stool from Per Rectum Updated: 07/28/23 1406     Toxigenic C. difficile by PCR Not Detected    Narrative:      The result indicates the absence of toxigenic C. difficile from stool specimen.             CT Abdomen Pelvis With Contrast    Result Date: 7/28/2023  CT ABDOMEN PELVIS W CONTRAST Date of Exam: 7/28/2023 3:00 PM EDT Indication: abd pain, h/o uc,. Comparison: None available. Technique: Axial CT images were obtained of the abdomen and pelvis following the uneventful intravenous administration of . Reconstructed coronal and sagittal images were also obtained. Automated exposure control and iterative construction methods were used. Findings: Limited evaluation of the lung bases demonstrate no gross abnormality. The liver is unremarkable. There are no radio opaque gallbladder calculi. The spleen is enlarged measuring 12.3 cm. 7The pancreases is unremarkable. There are no adrenal nodules. The kidneys are within normal limits. There is no small bowel obstruction . The appendix is unremarkable. There is diffuse wall thickening of the colon with pericolonic stranding from the cecum through the rectum consistent with a pancolitis. The urinary bladder is unremarkable. The uterus is unremarkable. There is no pelvic free fluid. The osseous structures are within normal limits. IMPESSION : Pancolitis with wall thickening pericolonic stranding from the cecum to the rectum. No evidence of bowel obstruction. Splenomegaly. Electronically Signed: Zia Rivero  7/28/2023 3:17 PM EDT  Workstation ID: [id number]         Current medications:  Scheduled Meds:dicyclomine, 10 mg, Oral, 4x  Daily  senna-docusate sodium, 2 tablet, Oral, BID  sodium chloride, 10 mL, Intravenous, Q12H      Continuous Infusions:hydrocortisone (Solu-CORTEF) infusion, 200 mg, Last Rate: 12.5 mL/hr at 07/29/23 1416      PRN Meds:.  acetaminophen **OR** acetaminophen **OR** acetaminophen    senna-docusate sodium **AND** polyethylene glycol **AND** bisacodyl **AND** bisacodyl    calcium carbonate    HYDROcodone-acetaminophen    Morphine    ondansetron **OR** ondansetron    simethicone    [COMPLETED] Insert Peripheral IV **AND** sodium chloride    sodium chloride    sodium chloride    Assessment & Plan   Assessment & Plan     Active Hospital Problems    Diagnosis  POA    **Bloody diarrhea [R19.7]  Yes    Splenomegaly [R16.1]  Unknown    Pancolitis [K51.00]  Unknown    Ulcerative colitis with rectal bleeding [K51.911]  Yes      Resolved Hospital Problems   No resolved problems to display.        Brief Hospital Course to date:  Maria Victoria Pena is a 47 y.o. female with history of ulcerative colitis diagnosed 19 years ago who presents with diffuse abd pain and bloody diarrhea. GI PCR and cdiff negative. CT abd/pel with pancolitis with wall thickening and stranding from cecum to rectum     Ulcerative colitis  Blood diarrhea  - Imaging per above  - No history of maintenance treatment for colitis. History of steroid use about once per year  - GI consulted -- s/p c-scope 7/29  - Hydrocortisone gtt  - Workup pending for consideration of biologics   - Recommend DEXA outpatient with chronic intermittent steroid use  - Bentyl, PRN pain management   - low fiber diet    Splenomegaly   - Noted on imaging     Expected Discharge Location and Transportation: home  Expected Discharge   Expected discharge date/ time has not been documented.     DVT prophylaxis:  Mechanical DVT prophylaxis orders are present.     AM-PAC 6 Clicks Score (PT): 24 (07/29/23 7026)    CODE STATUS:   Code Status and Medical Interventions:   Ordered at: 07/28/23 9051      Code Status (Patient has no pulse and is not breathing):    CPR (Attempt to Resuscitate)     Medical Interventions (Patient has pulse or is breathing):    Full Support     Release to patient:    Routine Release       Maria De Jesus Goode DO  07/29/23

## 2023-07-29 NOTE — PLAN OF CARE
Goal Outcome Evaluation:              Outcome Evaluation: Patient on room air. Complaints of stomach cramping resolved with PRN medications. Hydrocortef drip ongoing. Patient with faint blood in stool. Patient reeducated about appropriate dietary choices, pain control, safety precautions.

## 2023-07-29 NOTE — ANESTHESIA POSTPROCEDURE EVALUATION
Patient: Maria Victoria PEMBERTON Lula    Procedure Summary       Date: 07/29/23 Room / Location:  [facility] /  [facility]    Anesthesia Start: 0743 Anesthesia Stop: 0820    Procedure: COLONOSCOPY Diagnosis:       Pancolitis      (Pancolitis [K51.00])    Surgeons: Charlotte East MD Provider: Judith Max MD    Anesthesia Type: general ASA Status: 2            Anesthesia Type: general    Vitals  Vitals Value Taken Time   /55 07/29/23 0820   Temp 97.3 °F (36.3 °C) 07/29/23 0820   Pulse 94 07/29/23 0820   Resp 12 07/29/23 0820   SpO2 100 % 07/29/23 0820           Post Anesthesia Care and Evaluation    Patient location during evaluation: PACU  Patient participation: complete - patient participated  Level of consciousness: awake and alert  Pain score: 0  Pain management: adequate    Airway patency: patent  Anesthetic complications: No anesthetic complications  PONV Status: none  Cardiovascular status: hemodynamically stable and acceptable  Respiratory status: nonlabored ventilation, acceptable and nasal cannula  Hydration status: acceptable

## 2023-07-29 NOTE — PLAN OF CARE
Problem: Adult Inpatient Plan of Care  Goal: Absence of Hospital-Acquired Illness or Injury  Intervention: Identify and Manage Fall Risk  Recent Flowsheet Documentation  Taken 7/29/2023 0400 by Susan Fleming RN  Safety Promotion/Fall Prevention:   activity supervised   assistive device/personal items within reach   clutter free environment maintained   fall prevention program maintained   lighting adjusted   nonskid shoes/slippers when out of bed   room organization consistent   safety round/check completed  Taken 7/29/2023 0200 by Susan Fleming, MK  Safety Promotion/Fall Prevention:   activity supervised   assistive device/personal items within reach   clutter free environment maintained   fall prevention program maintained   lighting adjusted   nonskid shoes/slippers when out of bed   room organization consistent   safety round/check completed  Taken 7/29/2023 0000 by Susan Fleming RN  Safety Promotion/Fall Prevention:   activity supervised   assistive device/personal items within reach   clutter free environment maintained   fall prevention program maintained   lighting adjusted   nonskid shoes/slippers when out of bed   room organization consistent   safety round/check completed  Taken 7/28/2023 2220 by Susan Fleming, RN  Safety Promotion/Fall Prevention:   activity supervised   assistive device/personal items within reach   clutter free environment maintained   fall prevention program maintained   lighting adjusted   nonskid shoes/slippers when out of bed   room organization consistent   safety round/check completed  Taken 7/28/2023 2000 by Susan Fleming, RN  Safety Promotion/Fall Prevention:   activity supervised   assistive device/personal items within reach   clutter free environment maintained   fall prevention program maintained   lighting adjusted   nonskid shoes/slippers when out of bed   room organization consistent   safety round/check completed  Intervention: Prevent Skin Injury  Recent Flowsheet  Documentation  Taken 7/29/2023 0400 by Susan Fleming RN  Body Position: position changed independently  Taken 7/29/2023 0200 by Susan Fleming RN  Body Position: position changed independently  Taken 7/29/2023 0000 by Susan Fleming RN  Body Position: position changed independently  Taken 7/28/2023 2220 by Susan Fleming RN  Body Position: position changed independently  Taken 7/28/2023 2000 by Susan Fleming RN  Body Position: position changed independently  Intervention: Prevent and Manage VTE (Venous Thromboembolism) Risk  Recent Flowsheet Documentation  Taken 7/29/2023 0400 by Susan Fleming RN  Activity Management: activity encouraged  Taken 7/29/2023 0200 by Susan Fleming RN  Activity Management: activity encouraged  Taken 7/29/2023 0000 by Susan Fleming RN  Activity Management: activity encouraged  Taken 7/28/2023 2220 by Susan Fleming RN  Activity Management: activity encouraged  Taken 7/28/2023 2000 by Susan Fleming RN  Activity Management: ambulated to bathroom  Intervention: Prevent Infection  Recent Flowsheet Documentation  Taken 7/29/2023 0400 by Susan Fleming RN  Infection Prevention:   cohorting utilized   environmental surveillance performed   hand hygiene promoted   rest/sleep promoted   single patient room provided  Taken 7/29/2023 0200 by Susan Fleming RN  Infection Prevention:   cohorting utilized   environmental surveillance performed   hand hygiene promoted   rest/sleep promoted   single patient room provided  Taken 7/29/2023 0000 by Susan Fleming RN  Infection Prevention:   cohorting utilized   environmental surveillance performed   hand hygiene promoted   rest/sleep promoted   single patient room provided  Taken 7/28/2023 2220 by Susan Fleming RN  Infection Prevention:   cohorting utilized   environmental surveillance performed   hand hygiene promoted   rest/sleep promoted   single patient room provided  Taken 7/28/2023 2000 by Susan Fleming RN  Infection Prevention:   cohorting  utilized   environmental surveillance performed   hand hygiene promoted   rest/sleep promoted   single patient room provided  Goal: Optimal Comfort and Wellbeing  Outcome: Ongoing, Progressing   Goal Outcome Evaluation:

## 2023-07-30 LAB
ANION GAP SERPL CALCULATED.3IONS-SCNC: 11 MMOL/L (ref 5–15)
BUN SERPL-MCNC: 7 MG/DL (ref 6–20)
BUN/CREAT SERPL: 14.9 (ref 7–25)
CALCIUM SPEC-SCNC: 8.6 MG/DL (ref 8.6–10.5)
CHLORIDE SERPL-SCNC: 104 MMOL/L (ref 98–107)
CO2 SERPL-SCNC: 24 MMOL/L (ref 22–29)
CREAT SERPL-MCNC: 0.47 MG/DL (ref 0.57–1)
DEPRECATED RDW RBC AUTO: 42.4 FL (ref 37–54)
EGFRCR SERPLBLD CKD-EPI 2021: 119.8 ML/MIN/1.73
ERYTHROCYTE [DISTWIDTH] IN BLOOD BY AUTOMATED COUNT: 14.4 % (ref 12.3–15.4)
GLUCOSE SERPL-MCNC: 125 MG/DL (ref 65–99)
HCT VFR BLD AUTO: 34.6 % (ref 34–46.6)
HGB BLD-MCNC: 10.7 G/DL (ref 12–15.9)
MCH RBC QN AUTO: 25.2 PG (ref 26.6–33)
MCHC RBC AUTO-ENTMCNC: 30.9 G/DL (ref 31.5–35.7)
MCV RBC AUTO: 81.6 FL (ref 79–97)
PLATELET # BLD AUTO: 304 10*3/MM3 (ref 140–450)
PMV BLD AUTO: 10.6 FL (ref 6–12)
POTASSIUM SERPL-SCNC: 4 MMOL/L (ref 3.5–5.2)
RBC # BLD AUTO: 4.24 10*6/MM3 (ref 3.77–5.28)
SODIUM SERPL-SCNC: 139 MMOL/L (ref 136–145)
WBC NRBC COR # BLD: 6.99 10*3/MM3 (ref 3.4–10.8)

## 2023-07-30 PROCEDURE — 80048 BASIC METABOLIC PNL TOTAL CA: CPT | Performed by: INTERNAL MEDICINE

## 2023-07-30 PROCEDURE — 85027 COMPLETE CBC AUTOMATED: CPT | Performed by: INTERNAL MEDICINE

## 2023-07-30 PROCEDURE — 83993 ASSAY FOR CALPROTECTIN FECAL: CPT | Performed by: INTERNAL MEDICINE

## 2023-07-30 PROCEDURE — 99231 SBSQ HOSP IP/OBS SF/LOW 25: CPT | Performed by: INTERNAL MEDICINE

## 2023-07-30 PROCEDURE — 63710000001 ONDANSETRON PER 8 MG: Performed by: INTERNAL MEDICINE

## 2023-07-30 PROCEDURE — 99232 SBSQ HOSP IP/OBS MODERATE 35: CPT | Performed by: INTERNAL MEDICINE

## 2023-07-30 RX ADMIN — DICYCLOMINE HYDROCHLORIDE 10 MG: 10 CAPSULE ORAL at 17:11

## 2023-07-30 RX ADMIN — DICYCLOMINE HYDROCHLORIDE 10 MG: 10 CAPSULE ORAL at 12:01

## 2023-07-30 RX ADMIN — SIMETHICONE 80 MG: 80 TABLET, CHEWABLE ORAL at 03:57

## 2023-07-30 RX ADMIN — ONDANSETRON HYDROCHLORIDE 4 MG: 4 TABLET, FILM COATED ORAL at 23:00

## 2023-07-30 RX ADMIN — DICYCLOMINE HYDROCHLORIDE 10 MG: 10 CAPSULE ORAL at 23:00

## 2023-07-30 RX ADMIN — HYDROCODONE BITARTRATE AND ACETAMINOPHEN 1 TABLET: 5; 325 TABLET ORAL at 03:57

## 2023-07-30 RX ADMIN — ONDANSETRON HYDROCHLORIDE 4 MG: 4 TABLET, FILM COATED ORAL at 09:45

## 2023-07-30 RX ADMIN — Medication 10 ML: at 09:45

## 2023-07-30 RX ADMIN — ONDANSETRON HYDROCHLORIDE 4 MG: 4 TABLET, FILM COATED ORAL at 03:57

## 2023-07-30 RX ADMIN — SIMETHICONE 80 MG: 80 TABLET, CHEWABLE ORAL at 15:36

## 2023-07-30 RX ADMIN — HYDROCODONE BITARTRATE AND ACETAMINOPHEN 1 TABLET: 5; 325 TABLET ORAL at 20:25

## 2023-07-30 RX ADMIN — ACETAMINOPHEN 650 MG: 325 TABLET ORAL at 14:20

## 2023-07-30 RX ADMIN — DICYCLOMINE HYDROCHLORIDE 10 MG: 10 CAPSULE ORAL at 09:45

## 2023-07-30 RX ADMIN — ONDANSETRON HYDROCHLORIDE 4 MG: 4 TABLET, FILM COATED ORAL at 17:11

## 2023-07-30 NOTE — PLAN OF CARE
Goal Outcome Evaluation:              Outcome Evaluation: Patient on room air. Complaints of stomach cramping somewhat resolved with PRN medications, hydrocortef drip ongoing. Fair PO intake.

## 2023-07-30 NOTE — PROGRESS NOTES
"GI Daily Progress Note  Subjective:    Chief Complaint:  diarrhea    Patient reports zofran and bentyl helped.  She had IV infltrate but thinks she go at least 8 hours of IV steroids yesterday;  Patient happy because she was able to eat    Objective:    /60 (BP Location: Right arm, Patient Position: Lying)   Pulse 76   Temp 97.9 øF (36.6 øC) (Oral)   Resp 18   Ht 154.9 cm (61\")   Wt 84.5 kg (186 lb 3.2 oz)   SpO2 97%   BMI 35.18 kg/mý     Physical Exam  Constitutional:       Appearance: Normal appearance.   HENT:      Nose: Nose normal.   Cardiovascular:      Rate and Rhythm: Normal rate and regular rhythm.   Pulmonary:      Effort: Pulmonary effort is normal.      Breath sounds: Normal breath sounds.   Abdominal:      General: Abdomen is flat. Bowel sounds are normal.      Palpations: Abdomen is soft.   Neurological:      General: No focal deficit present.      Mental Status: She is alert.   Psychiatric:         Mood and Affect: Mood normal.       Lab  Lab Results   Component Value Date    WBC 6.99 07/30/2023    HGB 10.7 (L) 07/30/2023    HGB 10.9 (L) 07/29/2023    HGB 11.2 (L) 07/28/2023    MCV 81.6 07/30/2023     07/30/2023       Lab Results   Component Value Date    GLUCOSE 125 (H) 07/30/2023    BUN 7 07/30/2023    CREATININE 0.47 (L) 07/30/2023    BCR 14.9 07/30/2023     07/30/2023    K 4.0 07/30/2023    CO2 24.0 07/30/2023    CALCIUM 8.6 07/30/2023    ALBUMIN 3.4 (L) 07/29/2023    ALKPHOS 63 07/29/2023    BILITOT 0.3 07/29/2023    ALT 12 07/29/2023    AST 15 07/29/2023       Assessment:      Bloody diarrhea    Ulcerative colitis with rectal bleeding    Splenomegaly    Pancolitis      Biopsies pending    Plan:    Continue steroid drip  Recommend DEXA outpatient  Patient will likely need to start a biologic long term for her ulcerative colitis  Needs outpatient GI follow up  Fecal calprotectin  Risk Immune  Tb testing pending  Hepatitis B negative      Charlotte East MD  07/30/23  17:01 " EDT

## 2023-07-30 NOTE — PLAN OF CARE
Problem: Adult Inpatient Plan of Care  Goal: Absence of Hospital-Acquired Illness or Injury  Outcome: Ongoing, Progressing  Intervention: Identify and Manage Fall Risk  Recent Flowsheet Documentation  Taken 7/30/2023 0200 by Susan Fleming RN  Safety Promotion/Fall Prevention:   activity supervised   assistive device/personal items within reach   clutter free environment maintained   fall prevention program maintained   lighting adjusted   nonskid shoes/slippers when out of bed   room organization consistent   safety round/check completed  Taken 7/30/2023 0000 by Susan Fleming RN  Safety Promotion/Fall Prevention:   activity supervised   assistive device/personal items within reach   clutter free environment maintained   fall prevention program maintained   lighting adjusted   nonskid shoes/slippers when out of bed   room organization consistent   safety round/check completed  Taken 7/29/2023 2200 by Susan Fleming RN  Safety Promotion/Fall Prevention:   activity supervised   assistive device/personal items within reach   clutter free environment maintained   fall prevention program maintained   lighting adjusted   nonskid shoes/slippers when out of bed   room organization consistent   safety round/check completed  Taken 7/29/2023 2000 by Susan Fleming RN  Safety Promotion/Fall Prevention:   activity supervised   assistive device/personal items within reach   clutter free environment maintained   fall prevention program maintained   lighting adjusted   nonskid shoes/slippers when out of bed   room organization consistent   safety round/check completed  Intervention: Prevent Skin Injury  Recent Flowsheet Documentation  Taken 7/30/2023 0200 by Susan Fleming RN  Body Position: position changed independently  Taken 7/30/2023 0000 by Susan Fleming RN  Body Position: position changed independently  Taken 7/29/2023 2200 by Susan Fleming RN  Body Position: position changed independently  Skin Protection:   adhesive  use limited   incontinence pads utilized  Taken 7/29/2023 2000 by Susan Fleming RN  Body Position: position changed independently  Intervention: Prevent and Manage VTE (Venous Thromboembolism) Risk  Recent Flowsheet Documentation  Taken 7/30/2023 0200 by Susan Fleming RN  Activity Management: activity encouraged  Taken 7/30/2023 0000 by Susan Fleming RN  Activity Management: activity encouraged  Taken 7/29/2023 2200 by Susan Fleming RN  Activity Management: activity encouraged  Taken 7/29/2023 2000 by Susan Fleming RN  Activity Management: activity encouraged  Intervention: Prevent Infection  Recent Flowsheet Documentation  Taken 7/30/2023 0200 by Susan Fleming RN  Infection Prevention:   cohorting utilized   environmental surveillance performed   hand hygiene promoted   rest/sleep promoted   single patient room provided  Taken 7/30/2023 0000 by Susan Fleming RN  Infection Prevention:   cohorting utilized   environmental surveillance performed   hand hygiene promoted   rest/sleep promoted   single patient room provided  Taken 7/29/2023 2200 by Susan Fleming RN  Infection Prevention:   cohorting utilized   environmental surveillance performed   hand hygiene promoted   rest/sleep promoted   single patient room provided  Taken 7/29/2023 2000 by Susan Fleming RN  Infection Prevention:   cohorting utilized   environmental surveillance performed   hand hygiene promoted   single patient room provided   rest/sleep promoted   Goal Outcome Evaluation:

## 2023-07-30 NOTE — PROGRESS NOTES
[facility] Medicine Services  PROGRESS NOTE    Patient Name: Maria Victoria Pena  : 1975  MRN: [id number]    Date of Admission: 2023  Primary Care Physician: No primary care provider on file.    Subjective   Subjective     CC:  Abd pain, diarrhea    HPI:  No acute events but no change in symptoms. Continues with diarrhea and abd cramping. Bentyl and zofran help.     ROS:  Gen- No fevers, chills  CV- No chest pain, palpitations  Resp- No cough, dyspnea  GI- + N/V/D, abd pain     Objective   Objective     Vital Signs:   Temp:  [96.8 øF (36 øC)-99.6 øF (37.6 øC)] 96.8 øF (36 øC)  Heart Rate:  [71-97] 78  Resp:  [18] 18  BP: (102-117)/(64-79) 110/64     Physical Exam:  Constitutional: No acute distress, awake, alert  HENT: NCAT, mucous membranes moist  Respiratory: Clear to auscultation bilaterally, respiratory effort normal   Cardiovascular: RRR, no murmurs, rubs, or gallops  Gastrointestinal: Positive bowel sounds, soft, diffuse tenderness  Musculoskeletal: No bilateral ankle edema  Psychiatric: Appropriate affect, cooperative  Neurologic: Oriented x 3, strength symmetric in all extremities, Cranial Nerves grossly intact to confrontation, speech clear  Skin: No rashes      Results Reviewed:  LAB RESULTS:      Lab 23  0351 23  0414 23  1428 23  1246 23  1158   WBC 6.99 7.15  --  6.25  --    HEMOGLOBIN 10.7* 10.9*  --  11.2*  --    HEMATOCRIT 34.6 34.8  --  35.8  --    PLATELETS 304 252  --  253  --    NEUTROS ABS  --  4.46  --  3.52  --    IMMATURE GRANS (ABS)  --  0.04  --  0.04  --    LYMPHS ABS  --  1.32  --  1.42  --    MONOS ABS  --  0.77  --  0.80  --    EOS ABS  --  0.50*  --  0.42*  --    MCV 81.6 80.9  --  80.8  --    SED RATE  --   --   --  34*  --    CRP  --   --  2.76*  --   --    PROCALCITONIN  --   --   --   --  0.08   LACTATE  --   --   --   --  0.9         Lab 23  0351 23  0414 23  1158   SODIUM 139 137 140   POTASSIUM  4.0 3.6 4.0   CHLORIDE 104 103 105   CO2 24.0 24.0 24.0   ANION GAP 11.0 10.0 11.0   BUN 7 4* 4*   CREATININE 0.47* 0.48* 0.50*   EGFR 119.8 119.2 118.0   GLUCOSE 125* 92 90   CALCIUM 8.6 8.2* 8.5*         Lab 07/29/23  0414 07/28/23  1158   TOTAL PROTEIN 6.2 6.3   ALBUMIN 3.4* 3.5   GLOBULIN 2.8 2.8   ALT (SGPT) 12 12   AST (SGOT) 15 17   BILIRUBIN 0.3 0.3   ALK PHOS 63 70                     Brief Urine Lab Results  (Last result in the past 365 days)        Color   Clarity   Blood   Leuk Est   Nitrite   Protein   CREAT   Urine HCG        07/28/23 1759 Yellow   Clear   Trace   Negative   Negative   Negative                   Microbiology Results Abnormal       Procedure Component Value - Date/Time    Gastrointestinal Panel, PCR - Stool, Per Rectum [[id number]]  (Normal) Collected: 07/28/23 1240    Lab Status: Final result Specimen: Stool from Per Rectum Updated: 07/28/23 1434     Campylobacter Not Detected     Plesiomonas shigelloides Not Detected     Salmonella Not Detected     Vibrio Not Detected     Vibrio cholerae Not Detected     Yersinia enterocolitica Not Detected     Enteroaggregative E. coli (EAEC) Not Detected     Enteropathogenic E. coli (EPEC) Not Detected     Enterotoxigenic E. coli (ETEC) lt/st Not Detected     Shiga-like toxin-producing E. coli (STEC) stx1/stx2 Not Detected     Shigella/Enteroinvasive E. coli (EIEC) Not Detected     Cryptosporidium Not Detected     Cyclospora cayetanensis Not Detected     Entamoeba histolytica Not Detected     Giardia lamblia Not Detected     Adenovirus F40/41 Not Detected     Astrovirus Not Detected     Norovirus GI/GII Not Detected     Rotavirus A Not Detected     Sapovirus (I, II, IV or V) Not Detected    Clostridioides difficile Toxin - Stool, Per Rectum [[id number]]  (Normal) Collected: 07/28/23 1240    Lab Status: Final result Specimen: Stool from Per Rectum Updated: 07/28/23 1406    Narrative:      The following orders were created for panel order Clostridioides  difficile Toxin - Stool, Per Rectum.  Procedure                               Abnormality         Status                     ---------                               -----------         ------                     Clostridioides difficile...[[id number]]  Normal              Final result                 Please view results for these tests on the individual orders.    Clostridioides difficile Toxin, PCR - Stool, Per Rectum [[id number]]  (Normal) Collected: 07/28/23 1240    Lab Status: Final result Specimen: Stool from Per Rectum Updated: 07/28/23 1406     Toxigenic C. difficile by PCR Not Detected    Narrative:      The result indicates the absence of toxigenic C. difficile from stool specimen.             CT Abdomen Pelvis With Contrast    Result Date: 7/28/2023  CT ABDOMEN PELVIS W CONTRAST Date of Exam: 7/28/2023 3:00 PM EDT Indication: abd pain, h/o uc,. Comparison: None available. Technique: Axial CT images were obtained of the abdomen and pelvis following the uneventful intravenous administration of . Reconstructed coronal and sagittal images were also obtained. Automated exposure control and iterative construction methods were used. Findings: Limited evaluation of the lung bases demonstrate no gross abnormality. The liver is unremarkable. There are no radio opaque gallbladder calculi. The spleen is enlarged measuring 12.3 cm. 7The pancreases is unremarkable. There are no adrenal nodules. The kidneys are within normal limits. There is no small bowel obstruction . The appendix is unremarkable. There is diffuse wall thickening of the colon with pericolonic stranding from the cecum through the rectum consistent with a pancolitis. The urinary bladder is unremarkable. The uterus is unremarkable. There is no pelvic free fluid. The osseous structures are within normal limits. IMPESSION : Pancolitis with wall thickening pericolonic stranding from the cecum to the rectum. No evidence of bowel obstruction. Splenomegaly.  Electronically Signed: Zia Rivero  7/28/2023 3:17 PM EDT  Workstation ID: [id number]         Current medications:  Scheduled Meds:dicyclomine, 10 mg, Oral, 4x Daily  senna-docusate sodium, 2 tablet, Oral, BID  sodium chloride, 10 mL, Intravenous, Q12H      Continuous Infusions:hydrocortisone (Solu-CORTEF) infusion, 200 mg, Last Rate: 12.5 mL/hr at 07/29/23 1416      PRN Meds:.  acetaminophen **OR** acetaminophen **OR** acetaminophen    senna-docusate sodium **AND** polyethylene glycol **AND** bisacodyl **AND** bisacodyl    calcium carbonate    HYDROcodone-acetaminophen    Morphine    ondansetron **OR** ondansetron    simethicone    [COMPLETED] Insert Peripheral IV **AND** sodium chloride    sodium chloride    sodium chloride    Assessment & Plan   Assessment & Plan     Active Hospital Problems    Diagnosis  POA    **Bloody diarrhea [R19.7]  Yes    Splenomegaly [R16.1]  Unknown    Pancolitis [K51.00]  Unknown    Ulcerative colitis with rectal bleeding [K51.911]  Yes      Resolved Hospital Problems   No resolved problems to display.        Brief Hospital Course to date:  Maria Victoria Pena is a 47 y.o. female with history of ulcerative colitis diagnosed 19 years ago who presents with diffuse abd pain and bloody diarrhea. GI PCR and cdiff negative. CT abd/pel with pancolitis with wall thickening and stranding from cecum to rectum. GI was consulted. C-scope 7/29 with diffuse severe inflammation in examined colon consistent with pancolitis. She was started on hydrocortisone drip.      Pancolitis with history of ulcerative colitis  Bloody diarrhea  - Imaging per above  - No history of maintenance treatment for colitis. History of steroid use about once per year  - GI consulted -- s/p c-scope 7/29 with diffuse inflammation   - Hydrocortisone gtt  - Workup pending for consideration of biologics   - Recommend DEXA outpatient with chronic intermittent steroid use  - Bentyl, PRN zofran, PRN pain management   - low fiber  diet     Splenomegaly   - Noted on imaging    Expected Discharge Location and Transportation: home  Expected Discharge   Expected discharge date/ time has not been documented.     DVT prophylaxis:  Mechanical DVT prophylaxis orders are present.     AM-PAC 6 Clicks Score (PT): 24 (07/29/23 0724)    CODE STATUS:   Code Status and Medical Interventions:   Ordered at: 07/28/23 1534     Code Status (Patient has no pulse and is not breathing):    CPR (Attempt to Resuscitate)     Medical Interventions (Patient has pulse or is breathing):    Full Support     Release to patient:    Routine Release       Maria De Jesus Goode DO  07/30/23

## 2023-07-31 LAB
ANION GAP SERPL CALCULATED.3IONS-SCNC: 9 MMOL/L (ref 5–15)
BUN SERPL-MCNC: 9 MG/DL (ref 6–20)
BUN/CREAT SERPL: 18.8 (ref 7–25)
CALCIUM SPEC-SCNC: 8.5 MG/DL (ref 8.6–10.5)
CHLORIDE SERPL-SCNC: 106 MMOL/L (ref 98–107)
CO2 SERPL-SCNC: 26 MMOL/L (ref 22–29)
CREAT SERPL-MCNC: 0.48 MG/DL (ref 0.57–1)
CYTO UR: NORMAL
DEPRECATED RDW RBC AUTO: 42.5 FL (ref 37–54)
EGFRCR SERPLBLD CKD-EPI 2021: 119.2 ML/MIN/1.73
ERYTHROCYTE [DISTWIDTH] IN BLOOD BY AUTOMATED COUNT: 14.6 % (ref 12.3–15.4)
GLUCOSE SERPL-MCNC: 116 MG/DL (ref 65–99)
HCT VFR BLD AUTO: 31.4 % (ref 34–46.6)
HGB BLD-MCNC: 10 G/DL (ref 12–15.9)
LAB AP CASE REPORT: NORMAL
LAB AP CLINICAL INFORMATION: NORMAL
MCH RBC QN AUTO: 25.8 PG (ref 26.6–33)
MCHC RBC AUTO-ENTMCNC: 31.8 G/DL (ref 31.5–35.7)
MCV RBC AUTO: 81.1 FL (ref 79–97)
PATH REPORT.FINAL DX SPEC: NORMAL
PATH REPORT.GROSS SPEC: NORMAL
PLATELET # BLD AUTO: 294 10*3/MM3 (ref 140–450)
PMV BLD AUTO: 10.1 FL (ref 6–12)
POTASSIUM SERPL-SCNC: 4.3 MMOL/L (ref 3.5–5.2)
QT INTERVAL: 328 MS
QTC INTERVAL: 431 MS
RBC # BLD AUTO: 3.87 10*6/MM3 (ref 3.77–5.28)
SODIUM SERPL-SCNC: 141 MMOL/L (ref 136–145)
WBC NRBC COR # BLD: 6.8 10*3/MM3 (ref 3.4–10.8)

## 2023-07-31 PROCEDURE — 25010000002 MORPHINE PER 10 MG: Performed by: INTERNAL MEDICINE

## 2023-07-31 PROCEDURE — 85027 COMPLETE CBC AUTOMATED: CPT | Performed by: INTERNAL MEDICINE

## 2023-07-31 PROCEDURE — 80048 BASIC METABOLIC PNL TOTAL CA: CPT | Performed by: INTERNAL MEDICINE

## 2023-07-31 PROCEDURE — 25010000002 HYDROCORTISONE SOD SUC (PF) 100 MG RECONSTITUTED SOLUTION 1 EACH VIAL: Performed by: INTERNAL MEDICINE

## 2023-07-31 PROCEDURE — 99233 SBSQ HOSP IP/OBS HIGH 50: CPT | Performed by: INTERNAL MEDICINE

## 2023-07-31 PROCEDURE — 63710000001 ONDANSETRON PER 8 MG: Performed by: INTERNAL MEDICINE

## 2023-07-31 PROCEDURE — 99231 SBSQ HOSP IP/OBS SF/LOW 25: CPT | Performed by: INTERNAL MEDICINE

## 2023-07-31 PROCEDURE — 25010000002 HYDROCORTISONE SOD SUC (PF) 100 MG RECONSTITUTED SOLUTION 1 EACH VIAL: Performed by: NURSE PRACTITIONER

## 2023-07-31 RX ORDER — PREDNISONE 20 MG/1
60 TABLET ORAL
Status: DISCONTINUED | OUTPATIENT
Start: 2023-08-01 | End: 2023-08-02 | Stop reason: HOSPADM

## 2023-07-31 RX ORDER — VALACYCLOVIR HYDROCHLORIDE 500 MG/1
1000 TABLET, FILM COATED ORAL EVERY 8 HOURS SCHEDULED
Status: DISCONTINUED | OUTPATIENT
Start: 2023-07-31 | End: 2023-08-02 | Stop reason: HOSPADM

## 2023-07-31 RX ORDER — DIPHENHYDRAMINE HYDROCHLORIDE, ZINC ACETATE 2; .1 G/100G; G/100G
1 CREAM TOPICAL 3 TIMES DAILY PRN
Status: DISCONTINUED | OUTPATIENT
Start: 2023-07-31 | End: 2023-08-02 | Stop reason: HOSPADM

## 2023-07-31 RX ADMIN — ONDANSETRON HYDROCHLORIDE 4 MG: 4 TABLET, FILM COATED ORAL at 09:41

## 2023-07-31 RX ADMIN — MORPHINE SULFATE 2 MG: 2 INJECTION, SOLUTION INTRAMUSCULAR; INTRAVENOUS at 11:32

## 2023-07-31 RX ADMIN — VALACYCLOVIR HYDROCHLORIDE 1000 MG: 500 TABLET, FILM COATED ORAL at 11:32

## 2023-07-31 RX ADMIN — SODIUM CHLORIDE 200 MG: 9 INJECTION, SOLUTION INTRAVENOUS at 23:32

## 2023-07-31 RX ADMIN — DICYCLOMINE HYDROCHLORIDE 10 MG: 10 CAPSULE ORAL at 19:09

## 2023-07-31 RX ADMIN — MORPHINE SULFATE 2 MG: 2 INJECTION, SOLUTION INTRAMUSCULAR; INTRAVENOUS at 21:20

## 2023-07-31 RX ADMIN — VALACYCLOVIR HYDROCHLORIDE 1000 MG: 500 TABLET, FILM COATED ORAL at 21:20

## 2023-07-31 RX ADMIN — SODIUM CHLORIDE 200 MG: 9 INJECTION, SOLUTION INTRAVENOUS at 04:19

## 2023-07-31 RX ADMIN — SENNOSIDES AND DOCUSATE SODIUM 2 TABLET: 50; 8.6 TABLET ORAL at 21:19

## 2023-07-31 RX ADMIN — SENNOSIDES AND DOCUSATE SODIUM 2 TABLET: 50; 8.6 TABLET ORAL at 09:41

## 2023-07-31 RX ADMIN — DICYCLOMINE HYDROCHLORIDE 10 MG: 10 CAPSULE ORAL at 09:41

## 2023-07-31 RX ADMIN — ONDANSETRON HYDROCHLORIDE 4 MG: 4 TABLET, FILM COATED ORAL at 19:09

## 2023-07-31 RX ADMIN — Medication 10 ML: at 09:42

## 2023-07-31 RX ADMIN — Medication 10 ML: at 21:22

## 2023-07-31 RX ADMIN — DICYCLOMINE HYDROCHLORIDE 10 MG: 10 CAPSULE ORAL at 11:32

## 2023-07-31 RX ADMIN — HYDROCODONE BITARTRATE AND ACETAMINOPHEN 1 TABLET: 5; 325 TABLET ORAL at 04:27

## 2023-07-31 RX ADMIN — DICYCLOMINE HYDROCHLORIDE 10 MG: 10 CAPSULE ORAL at 21:20

## 2023-07-31 NOTE — PLAN OF CARE
Goal Outcome Evaluation:              Outcome Evaluation: VSS, pt remains on RA, NSR on tele-monitor, prn pain medication given x1, x2 bloody stools this shift, no c/o SOA, afebrile this shift, will cont. POC

## 2023-07-31 NOTE — CASE MANAGEMENT/SOCIAL WORK
Discharge Planning Assessment  [facility]     Patient Name: Maria Victoria Pena  MRN: [id number]  Today's Date: 7/31/2023    Admit Date: 7/28/2023    Plan: Home   Discharge Needs Assessment       Row Name 07/31/23 1228       Living Environment    People in Home spouse    Potentially Unsafe Housing Conditions none    Primary Care Provided by self       Transition Planning    Patient/Family Anticipates Transition to home with family    Patient/Family Anticipated Services at Transition        Discharge Needs Assessment    Equipment Currently Used at Home none    Equipment Needed After Discharge none                   Discharge Plan       Row Name 07/31/23 1229       Plan    Plan Home    Patient/Family in Agreement with Plan yes    Plan Comments Spoke with patient by phone. Patient lives with  in [facility]. She helps take care of  who is dx with CA. She mostly independent with ADL's until recently. Having to have help from family. She is not current with  services. PCP is Karol Snider at the [facility]. Insurance is [place] [employer] and [employer]. Discharge plan is home with family to transport. No discharge needs identified. CM will follow.    Final Discharge Disposition Code 01 - home or self-care                  Continued Care and Services - Admitted Since 7/28/2023    Coordination has not been started for this encounter.       Expected Discharge Date and Time       Expected Discharge Date Expected Discharge Time    Aug 1, 2023            Demographic Summary       Row Name 07/31/23 1228       General Information    Admission Type --    Preferred Language English                   Functional Status       Row Name 07/31/23 1229       Functional Status, IADL    Medications independent    Meal Preparation independent    Housekeeping independent    Laundry independent    Shopping independent                   Psychosocial    No documentation.                  Abuse/Neglect    No  documentation.                  Legal    No documentation.                  Substance Abuse    No documentation.                  Patient Forms    No documentation.                     Faiza Otero RN

## 2023-07-31 NOTE — PROGRESS NOTES
[facility] Medicine Services  PROGRESS NOTE    Patient Name: Maria Victoria Pena  : 1975  MRN: [id number]    Date of Admission: 2023  Primary Care Physician: No primary care provider on file.    Subjective   Subjective     CC:  Abd pain, diarrhea    HPI:  States that BM's less frequent but now having abdominal pain.  BRBPR but less.  Reports BM about 6-7x between 1:30AM and 7am.  C/o rash on her thigh that is burning really bad    ROS:  Gen- No fevers, chills  CV- No chest pain, palpitations  Resp- No cough, dyspnea  GI- + N/V/D, abd pain     Objective   Objective     Vital Signs:   Temp:  [97.1 øF (36.2 øC)-98.4 øF (36.9 øC)] 97.6 øF (36.4 øC)  Heart Rate:  [67-94] 94  Resp:  [16] 16  BP: (105-124)/(60-90) 120/83     Physical Exam:  Constitutional: No acute distress, awake, alert  HENT: NCAT, mucous membranes moist  Respiratory: Clear to auscultation bilaterally, respiratory effort normal   Cardiovascular: RRR, no murmurs, rubs, or gallops  Gastrointestinal: Positive bowel sounds, soft, diffuse tenderness  Musculoskeletal: No bilateral ankle edema  Psychiatric: Appropriate affect, cooperative  Neurologic: Oriented x 3, strength symmetric in all extremities, Cranial Nerves grossly intact to confrontation, speech clear  Skin: line of vesicles on posterior right thigh      Results Reviewed:  LAB RESULTS:      Lab 23  0637 23  0351 23  0414 23  1428 23  1246 23  1158   WBC 6.80 6.99 7.15  --  6.25  --    HEMOGLOBIN 10.0* 10.7* 10.9*  --  11.2*  --    HEMATOCRIT 31.4* 34.6 34.8  --  35.8  --    PLATELETS 294 304 252  --  253  --    NEUTROS ABS  --   --  4.46  --  3.52  --    IMMATURE GRANS (ABS)  --   --  0.04  --  0.04  --    LYMPHS ABS  --   --  1.32  --  1.42  --    MONOS ABS  --   --  0.77  --  0.80  --    EOS ABS  --   --  0.50*  --  0.42*  --    MCV 81.1 81.6 80.9  --  80.8  --    SED RATE  --   --   --   --  34*  --    CRP  --   --   --   2.76*  --   --    PROCALCITONIN  --   --   --   --   --  0.08   LACTATE  --   --   --   --   --  0.9         Lab 07/31/23  0637 07/30/23  0351 07/29/23  0414 07/28/23  1158   SODIUM 141 139 137 140   POTASSIUM 4.3 4.0 3.6 4.0   CHLORIDE 106 104 103 105   CO2 26.0 24.0 24.0 24.0   ANION GAP 9.0 11.0 10.0 11.0   BUN 9 7 4* 4*   CREATININE 0.48* 0.47* 0.48* 0.50*   EGFR 119.2 119.8 119.2 118.0   GLUCOSE 116* 125* 92 90   CALCIUM 8.5* 8.6 8.2* 8.5*         Lab 07/29/23  0414 07/28/23  1158   TOTAL PROTEIN 6.2 6.3   ALBUMIN 3.4* 3.5   GLOBULIN 2.8 2.8   ALT (SGPT) 12 12   AST (SGOT) 15 17   BILIRUBIN 0.3 0.3   ALK PHOS 63 70                     Brief Urine Lab Results  (Last result in the past 365 days)        Color   Clarity   Blood   Leuk Est   Nitrite   Protein   CREAT   Urine HCG        07/28/23 1759 Yellow   Clear   Trace   Negative   Negative   Negative                   Microbiology Results Abnormal       Procedure Component Value - Date/Time    Gastrointestinal Panel, PCR - Stool, Per Rectum [[id number]]  (Normal) Collected: 07/28/23 1240    Lab Status: Final result Specimen: Stool from Per Rectum Updated: 07/28/23 1434     Campylobacter Not Detected     Plesiomonas shigelloides Not Detected     Salmonella Not Detected     Vibrio Not Detected     Vibrio cholerae Not Detected     Yersinia enterocolitica Not Detected     Enteroaggregative E. coli (EAEC) Not Detected     Enteropathogenic E. coli (EPEC) Not Detected     Enterotoxigenic E. coli (ETEC) lt/st Not Detected     Shiga-like toxin-producing E. coli (STEC) stx1/stx2 Not Detected     Shigella/Enteroinvasive E. coli (EIEC) Not Detected     Cryptosporidium Not Detected     Cyclospora cayetanensis Not Detected     Entamoeba histolytica Not Detected     Giardia lamblia Not Detected     Adenovirus F40/41 Not Detected     Astrovirus Not Detected     Norovirus GI/GII Not Detected     Rotavirus A Not Detected     Sapovirus (I, II, IV or V) Not Detected     Clostridioides difficile Toxin - Stool, Per Rectum [[id number]]  (Normal) Collected: 07/28/23 1240    Lab Status: Final result Specimen: Stool from Per Rectum Updated: 07/28/23 1406    Narrative:      The following orders were created for panel order Clostridioides difficile Toxin - Stool, Per Rectum.  Procedure                               Abnormality         Status                     ---------                               -----------         ------                     Clostridioides difficile...[[id number]]  Normal              Final result                 Please view results for these tests on the individual orders.    Clostridioides difficile Toxin, PCR - Stool, Per Rectum [[id number]]  (Normal) Collected: 07/28/23 1240    Lab Status: Final result Specimen: Stool from Per Rectum Updated: 07/28/23 1406     Toxigenic C. difficile by PCR Not Detected    Narrative:      The result indicates the absence of toxigenic C. difficile from stool specimen.             No radiology results from the last 24 hrs        Current medications:  Scheduled Meds:dicyclomine, 10 mg, Oral, 4x Daily  senna-docusate sodium, 2 tablet, Oral, BID  sodium chloride, 10 mL, Intravenous, Q12H  valACYclovir, 1,000 mg, Oral, Q8H      Continuous Infusions:hydrocortisone (Solu-CORTEF) infusion, 200 mg, Last Rate: 200 mg (07/31/23 0419)      PRN Meds:.  acetaminophen **OR** acetaminophen **OR** acetaminophen    senna-docusate sodium **AND** polyethylene glycol **AND** bisacodyl **AND** bisacodyl    calcium carbonate    HYDROcodone-acetaminophen    Morphine    ondansetron **OR** ondansetron    simethicone    [COMPLETED] Insert Peripheral IV **AND** sodium chloride    sodium chloride    sodium chloride    Assessment & Plan   Assessment & Plan     Active Hospital Problems    Diagnosis  POA    **Bloody diarrhea [R19.7]  Yes    Splenomegaly [R16.1]  Unknown    Pancolitis [K51.00]  Unknown    Ulcerative colitis with rectal bleeding [K51.911]  Yes       Resolved Hospital Problems   No resolved problems to display.        Brief Hospital Course to date:  Maria Victoria Pena is a 47 y.o. female with history of ulcerative colitis diagnosed 19 years ago who presents with diffuse abd pain and bloody diarrhea. GI PCR and cdiff negative. CT abd/pel with pancolitis with wall thickening and stranding from cecum to rectum. GI was consulted. C-scope 7/29 with diffuse severe inflammation in examined colon consistent with pancolitis. She was started on hydrocortisone drip.      Pancolitis with history of ulcerative colitis  Bloody diarrhea  - Imaging per above  - No history of maintenance treatment for colitis. History of steroid use about once per year  - GI consulted -- s/p c-scope 7/29 with diffuse inflammation   - Hydrocortisone gtt  - Workup pending for consideration of biologics   - Recommend DEXA outpatient with chronic intermittent steroid use  - Bentyl, PRN zofran, PRN pain management   - low fiber diet    Possible Shingles Right Posterior Thigh  --start valtrex  --isolation     Splenomegaly   - Noted on imaging    Expected Discharge Location and Transportation: home  Expected Discharge   Expected Discharge Date: 8/1/2023; Expected Discharge Time:      DVT prophylaxis:  Mechanical DVT prophylaxis orders are present.     AM-PAC 6 Clicks Score (PT): 24 (07/30/23 0800)    CODE STATUS:   Code Status and Medical Interventions:   Ordered at: 07/28/23 1534     Code Status (Patient has no pulse and is not breathing):    CPR (Attempt to Resuscitate)     Medical Interventions (Patient has pulse or is breathing):    Full Support     Release to patient:    Routine Release       Gerson Lay MD  07/31/23

## 2023-07-31 NOTE — PLAN OF CARE
Goal Outcome Evaluation:              Outcome Evaluation: Frequency of BM's slowing. Pt continues to have cramping and pain. Norco given x2 this shift.

## 2023-08-01 LAB
ANION GAP SERPL CALCULATED.3IONS-SCNC: 8 MMOL/L (ref 5–15)
BUN SERPL-MCNC: 11 MG/DL (ref 6–20)
BUN/CREAT SERPL: 22 (ref 7–25)
CALCIUM SPEC-SCNC: 8.4 MG/DL (ref 8.6–10.5)
CALPROTECTIN STL-MCNT: 1060 UG/G (ref 0–120)
CHLORIDE SERPL-SCNC: 105 MMOL/L (ref 98–107)
CO2 SERPL-SCNC: 27 MMOL/L (ref 22–29)
CREAT SERPL-MCNC: 0.5 MG/DL (ref 0.57–1)
DEPRECATED RDW RBC AUTO: 43.5 FL (ref 37–54)
EGFRCR SERPLBLD CKD-EPI 2021: 118 ML/MIN/1.73
ERYTHROCYTE [DISTWIDTH] IN BLOOD BY AUTOMATED COUNT: 14.7 % (ref 12.3–15.4)
GAMMA INTERFERON BACKGROUND BLD IA-ACNC: 0.19 IU/ML
GLUCOSE SERPL-MCNC: 112 MG/DL (ref 65–99)
HCT VFR BLD AUTO: 30.9 % (ref 34–46.6)
HGB BLD-MCNC: 9.5 G/DL (ref 12–15.9)
M TB IFN-G BLD-IMP: NEGATIVE
M TB IFN-G CD4+ T-CELLS BLD-ACNC: 0.19 IU/ML
M TBIFN-G CD4+ CD8+T-CELLS BLD-ACNC: 0.19 IU/ML
MCH RBC QN AUTO: 25.1 PG (ref 26.6–33)
MCHC RBC AUTO-ENTMCNC: 30.7 G/DL (ref 31.5–35.7)
MCV RBC AUTO: 81.5 FL (ref 79–97)
MITOGEN IGNF BLD-ACNC: >10 IU/ML
PLATELET # BLD AUTO: 308 10*3/MM3 (ref 140–450)
PMV BLD AUTO: 10.3 FL (ref 6–12)
POTASSIUM SERPL-SCNC: 3.7 MMOL/L (ref 3.5–5.2)
QUANTIFERON INCUBATION: NORMAL
RBC # BLD AUTO: 3.79 10*6/MM3 (ref 3.77–5.28)
SERVICE CMNT-IMP: NORMAL
SODIUM SERPL-SCNC: 140 MMOL/L (ref 136–145)
WBC NRBC COR # BLD: 7.29 10*3/MM3 (ref 3.4–10.8)

## 2023-08-01 PROCEDURE — 80048 BASIC METABOLIC PNL TOTAL CA: CPT | Performed by: INTERNAL MEDICINE

## 2023-08-01 PROCEDURE — 25010000002 MORPHINE PER 10 MG: Performed by: INTERNAL MEDICINE

## 2023-08-01 PROCEDURE — 63710000001 ONDANSETRON PER 8 MG: Performed by: INTERNAL MEDICINE

## 2023-08-01 PROCEDURE — 99232 SBSQ HOSP IP/OBS MODERATE 35: CPT | Performed by: PHYSICIAN ASSISTANT

## 2023-08-01 PROCEDURE — 63710000001 PREDNISONE PER 1 MG: Performed by: INTERNAL MEDICINE

## 2023-08-01 PROCEDURE — 85027 COMPLETE CBC AUTOMATED: CPT | Performed by: INTERNAL MEDICINE

## 2023-08-01 RX ADMIN — PREDNISONE 60 MG: 20 TABLET ORAL at 09:14

## 2023-08-01 RX ADMIN — ONDANSETRON HYDROCHLORIDE 4 MG: 4 TABLET, FILM COATED ORAL at 20:39

## 2023-08-01 RX ADMIN — DICYCLOMINE HYDROCHLORIDE 10 MG: 10 CAPSULE ORAL at 18:11

## 2023-08-01 RX ADMIN — DICYCLOMINE HYDROCHLORIDE 10 MG: 10 CAPSULE ORAL at 14:56

## 2023-08-01 RX ADMIN — ONDANSETRON HYDROCHLORIDE 4 MG: 4 TABLET, FILM COATED ORAL at 14:56

## 2023-08-01 RX ADMIN — DICYCLOMINE HYDROCHLORIDE 10 MG: 10 CAPSULE ORAL at 09:14

## 2023-08-01 RX ADMIN — MORPHINE SULFATE 2 MG: 2 INJECTION, SOLUTION INTRAMUSCULAR; INTRAVENOUS at 09:20

## 2023-08-01 RX ADMIN — VALACYCLOVIR HYDROCHLORIDE 1000 MG: 500 TABLET, FILM COATED ORAL at 14:55

## 2023-08-01 RX ADMIN — DICYCLOMINE HYDROCHLORIDE 10 MG: 10 CAPSULE ORAL at 20:39

## 2023-08-01 RX ADMIN — Medication 10 ML: at 09:15

## 2023-08-01 RX ADMIN — VALACYCLOVIR HYDROCHLORIDE 1000 MG: 500 TABLET, FILM COATED ORAL at 05:51

## 2023-08-01 RX ADMIN — Medication 10 ML: at 20:40

## 2023-08-01 RX ADMIN — VALACYCLOVIR HYDROCHLORIDE 1000 MG: 500 TABLET, FILM COATED ORAL at 21:40

## 2023-08-01 RX ADMIN — ONDANSETRON HYDROCHLORIDE 4 MG: 4 TABLET, FILM COATED ORAL at 09:14

## 2023-08-01 RX ADMIN — DIPHENHYDRAMINE HYDROCHLORIDE, ZINC ACETATE 1 APPLICATION: 2; .1 CREAM TOPICAL at 00:26

## 2023-08-01 RX ADMIN — HYDROCODONE BITARTRATE AND ACETAMINOPHEN 1 TABLET: 5; 325 TABLET ORAL at 18:11

## 2023-08-01 NOTE — PLAN OF CARE
Goal Outcome Evaluation:  Plan of Care Reviewed With: patient        Progress: no change  Outcome Evaluation: Pt rested well this shift. 1x PRN morphine given. VSS. Continue current POC

## 2023-08-01 NOTE — PROGRESS NOTES
"GI Daily Progress Note  Subjective:    Chief Complaint:  \" I am doing better \"     Papular rash is present on both right and left thighs.   She states her pain is improving.        Bowel movement today was slightly more formed and without blood.       Objective:    /74 (BP Location: Left arm, Patient Position: Sitting)   Pulse 81   Temp 97.8 øF (36.6 øC) (Oral)   Resp 18   Ht 154.9 cm (61\")   Wt 84.5 kg (186 lb 3.2 oz)   SpO2 98%   BMI 35.18 kg/mý     Physical Exam  Constitutional:       General: She is not in acute distress.  Cardiovascular:      Rate and Rhythm: Normal rate and regular rhythm.   Pulmonary:      Effort: Pulmonary effort is normal. No respiratory distress.   Abdominal:      General: Bowel sounds are normal. There is no distension.      Palpations: Abdomen is soft.      Tenderness: There is no abdominal tenderness.   Skin:     Comments: Papular rash (2-3 papules) present on left and right thigh   Neurological:      Mental Status: She is alert and oriented to person, place, and time.       Lab  Lab Results   Component Value Date    WBC 7.29 08/01/2023    HGB 9.5 (L) 08/01/2023    HGB 10.0 (L) 07/31/2023    HGB 10.7 (L) 07/30/2023    MCV 81.5 08/01/2023     08/01/2023     Lab Results   Component Value Date    GLUCOSE 112 (H) 08/01/2023    BUN 11 08/01/2023    CREATININE 0.50 (L) 08/01/2023    BCR 22.0 08/01/2023     08/01/2023    K 3.7 08/01/2023    CO2 27.0 08/01/2023    CALCIUM 8.4 (L) 08/01/2023    ALBUMIN 3.4 (L) 07/29/2023    ALKPHOS 63 07/29/2023    BILITOT 0.3 07/29/2023    ALT 12 07/29/2023    AST 15 07/29/2023      Latest Reference Range & Units 07/28/23 18:03   Hepatitis B Surface Ag Non-Reactive  Non-Reactive     Quantiferon gold TB negative     Assessment:    Ulcerative colitis with rectal bleeding   Possible shingles, on Valtrex   Normocytic anemia, due to blood loss.  Stable     Plan:    >> Prednisone 60 mg daily.   Decrease by 5 mg weekly.     >> Continue Bentyl " 10 mg QID   >> Awaiting urine histo (pending)  >> Follow up outpatient with Dr. East in 3-4 weeks and will initiate biologic therapy that time when active infection is resolved.       Anticipate home soon.  Will sign off.  Please call for any questions or concerns.      CHENCHO Malone  08/01/23  12:02 EDT

## 2023-08-01 NOTE — PROGRESS NOTES
[facility] Medicine Services  PROGRESS NOTE    Patient Name: Maria Victoria Pena  : 1975  MRN: [id number]    Date of Admission: 2023  Primary Care Physician: No primary care provider on file.    Subjective   Subjective     CC:  Abd pain, diarrhea    HPI:  States that better this AM but had BM's x 5 btw 3-4 AM last night.  Was still having some clots but no blood this AM.  States that has some rash now on her left thigh as well but feels much better.  No longer burning.     ROS:  Gen- No fevers, chills  CV- No chest pain, palpitations  Resp- No cough, dyspnea  GI- + N/V/D, abd pain     Objective   Objective     Vital Signs:   Temp:  [97 øF (36.1 øC)-98 øF (36.7 øC)] 97.8 øF (36.6 øC)  Heart Rate:  [] 81  Resp:  [16-18] 18  BP: (107-137)/(74-86) 118/74     Physical Exam:  Constitutional: No acute distress, awake, alert  HENT: NCAT, mucous membranes moist  Respiratory: Clear to auscultation bilaterally, respiratory effort normal   Cardiovascular: RRR, no murmurs, rubs, or gallops  Gastrointestinal: Positive bowel sounds, soft, diffuse tenderness  Musculoskeletal: No bilateral ankle edema  Psychiatric: Appropriate affect, cooperative  Neurologic: Oriented x 3, strength symmetric in all extremities, Cranial Nerves grossly intact to confrontation, speech clear  Skin: line of vesicles on posterior right thigh look much better today but now with dried sores on left posterior thigh as well      Results Reviewed:  LAB RESULTS:      Lab 23  0601 23  0637 23  0351 23  0414 23  1428 23  1246 23  1158   WBC 7.29 6.80 6.99 7.15  --  6.25  --    HEMOGLOBIN 9.5* 10.0* 10.7* 10.9*  --  11.2*  --    HEMATOCRIT 30.9* 31.4* 34.6 34.8  --  35.8  --    PLATELETS 308 294 304 252  --  253  --    NEUTROS ABS  --   --   --  4.46  --  3.52  --    IMMATURE GRANS (ABS)  --   --   --  0.04  --  0.04  --    LYMPHS ABS  --   --   --  1.32  --  1.42  --    MONOS ABS   --   --   --  0.77  --  0.80  --    EOS ABS  --   --   --  0.50*  --  0.42*  --    MCV 81.5 81.1 81.6 80.9  --  80.8  --    SED RATE  --   --   --   --   --  34*  --    CRP  --   --   --   --  2.76*  --   --    PROCALCITONIN  --   --   --   --   --   --  0.08   LACTATE  --   --   --   --   --   --  0.9         Lab 08/01/23  0601 07/31/23  0637 07/30/23  0351 07/29/23  0414 07/28/23  1158   SODIUM 140 141 139 137 140   POTASSIUM 3.7 4.3 4.0 3.6 4.0   CHLORIDE 105 106 104 103 105   CO2 27.0 26.0 24.0 24.0 24.0   ANION GAP 8.0 9.0 11.0 10.0 11.0   BUN 11 9 7 4* 4*   CREATININE 0.50* 0.48* 0.47* 0.48* 0.50*   EGFR 118.0 119.2 119.8 119.2 118.0   GLUCOSE 112* 116* 125* 92 90   CALCIUM 8.4* 8.5* 8.6 8.2* 8.5*         Lab 07/29/23  0414 07/28/23  1158   TOTAL PROTEIN 6.2 6.3   ALBUMIN 3.4* 3.5   GLOBULIN 2.8 2.8   ALT (SGPT) 12 12   AST (SGOT) 15 17   BILIRUBIN 0.3 0.3   ALK PHOS 63 70                     Brief Urine Lab Results  (Last result in the past 365 days)        Color   Clarity   Blood   Leuk Est   Nitrite   Protein   CREAT   Urine HCG        07/28/23 1756 Yellow   Clear   Trace   Negative   Negative   Negative                   Microbiology Results Abnormal       Procedure Component Value - Date/Time    Gastrointestinal Panel, PCR - Stool, Per Rectum [[id number]]  (Normal) Collected: 07/28/23 1240    Lab Status: Final result Specimen: Stool from Per Rectum Updated: 07/28/23 4149     Campylobacter Not Detected     Plesiomonas shigelloides Not Detected     Salmonella Not Detected     Vibrio Not Detected     Vibrio cholerae Not Detected     Yersinia enterocolitica Not Detected     Enteroaggregative E. coli (EAEC) Not Detected     Enteropathogenic E. coli (EPEC) Not Detected     Enterotoxigenic E. coli (ETEC) lt/st Not Detected     Shiga-like toxin-producing E. coli (STEC) stx1/stx2 Not Detected     Shigella/Enteroinvasive E. coli (EIEC) Not Detected     Cryptosporidium Not Detected     Cyclospora cayetanensis Not  Detected     Entamoeba histolytica Not Detected     Giardia lamblia Not Detected     Adenovirus F40/41 Not Detected     Astrovirus Not Detected     Norovirus GI/GII Not Detected     Rotavirus A Not Detected     Sapovirus (I, II, IV or V) Not Detected    Clostridioides difficile Toxin - Stool, Per Rectum [[id number]]  (Normal) Collected: 07/28/23 1240    Lab Status: Final result Specimen: Stool from Per Rectum Updated: 07/28/23 1406    Narrative:      The following orders were created for panel order Clostridioides difficile Toxin - Stool, Per Rectum.  Procedure                               Abnormality         Status                     ---------                               -----------         ------                     Clostridioides difficile...[[id number]]  Normal              Final result                 Please view results for these tests on the individual orders.    Clostridioides difficile Toxin, PCR - Stool, Per Rectum [[id number]]  (Normal) Collected: 07/28/23 1240    Lab Status: Final result Specimen: Stool from Per Rectum Updated: 07/28/23 1406     Toxigenic C. difficile by PCR Not Detected    Narrative:      The result indicates the absence of toxigenic C. difficile from stool specimen.             No radiology results from the last 24 hrs        Current medications:  Scheduled Meds:dicyclomine, 10 mg, Oral, 4x Daily  predniSONE, 60 mg, Oral, Daily With Breakfast  senna-docusate sodium, 2 tablet, Oral, BID  sodium chloride, 10 mL, Intravenous, Q12H  valACYclovir, 1,000 mg, Oral, Q8H      Continuous Infusions:     PRN Meds:.  acetaminophen **OR** acetaminophen **OR** acetaminophen    senna-docusate sodium **AND** polyethylene glycol **AND** bisacodyl **AND** bisacodyl    calcium carbonate    diphenhydrAMINE-zinc acetate    HYDROcodone-acetaminophen    Morphine    ondansetron **OR** ondansetron    simethicone    [COMPLETED] Insert Peripheral IV **AND** sodium chloride    sodium chloride    sodium  chloride    Assessment & Plan   Assessment & Plan     Active Hospital Problems    Diagnosis  POA    **Bloody diarrhea [R19.7]  Yes    Splenomegaly [R16.1]  Unknown    Pancolitis [K51.00]  Unknown    Ulcerative colitis with rectal bleeding [K51.911]  Yes      Resolved Hospital Problems   No resolved problems to display.        Brief Hospital Course to date:  Maria Victoria Pena is a 47 y.o. female with history of ulcerative colitis diagnosed 19 years ago who presents with diffuse abd pain and bloody diarrhea. GI PCR and cdiff negative. CT abd/pel with pancolitis with wall thickening and stranding from cecum to rectum. GI was consulted. C-scope 7/29 with diffuse severe inflammation in examined colon consistent with pancolitis. She was started on hydrocortisone drip.      Pancolitis with history of ulcerative colitis  Bloody diarrhea  - Imaging per above  - No history of maintenance treatment for colitis. History of steroid use about once per year  - GI consulted -- s/p c-scope 7/29 with diffuse inflammation   - Hydrocortisone gtt transitioned to PO prednisone 60mg daily  - Workup pending for consideration of biologics--GI holding on starting d/t possible shingles  - Recommend DEXA outpatient with chronic intermittent steroid use  - Bentyl, PRN zofran, PRN pain management   - low fiber diet    Possible Shingles Right Posterior Thigh  --started valtrex on 7/31. Questionable since rash now on both thighs BUT it looks much better since starting valtrex so will continue treatment  --isolation     Splenomegaly   - Noted on imaging    Expected Discharge Location and Transportation: home  Expected Discharge   Expected Discharge Date: 8/2/2023; Expected Discharge Time:      DVT prophylaxis:  Mechanical DVT prophylaxis orders are present.     AM-PAC 6 Clicks Score (PT): 24 (07/30/23 0800)    CODE STATUS:   Code Status and Medical Interventions:   Ordered at: 07/28/23 2033     Code Status (Patient has no pulse and is not  breathing):    CPR (Attempt to Resuscitate)     Medical Interventions (Patient has pulse or is breathing):    Full Support     Release to patient:    Routine Release       Gerson Lay MD  08/01/23

## 2023-08-02 VITALS
BODY MASS INDEX: 35.16 KG/M2 | DIASTOLIC BLOOD PRESSURE: 67 MMHG | TEMPERATURE: 97.3 F | RESPIRATION RATE: 18 BRPM | HEIGHT: 61 IN | OXYGEN SATURATION: 98 % | SYSTOLIC BLOOD PRESSURE: 106 MMHG | HEART RATE: 84 BPM | WEIGHT: 186.2 LBS

## 2023-08-02 LAB
ANION GAP SERPL CALCULATED.3IONS-SCNC: 9 MMOL/L (ref 5–15)
BUN SERPL-MCNC: 12 MG/DL (ref 6–20)
BUN/CREAT SERPL: 21.1 (ref 7–25)
CALCIUM SPEC-SCNC: 8.3 MG/DL (ref 8.6–10.5)
CHLORIDE SERPL-SCNC: 105 MMOL/L (ref 98–107)
CO2 SERPL-SCNC: 27 MMOL/L (ref 22–29)
CREAT SERPL-MCNC: 0.57 MG/DL (ref 0.57–1)
DEPRECATED RDW RBC AUTO: 43.2 FL (ref 37–54)
EGFRCR SERPLBLD CKD-EPI 2021: 114.4 ML/MIN/1.73
ERYTHROCYTE [DISTWIDTH] IN BLOOD BY AUTOMATED COUNT: 14.6 % (ref 12.3–15.4)
GLUCOSE SERPL-MCNC: 86 MG/DL (ref 65–99)
HCT VFR BLD AUTO: 31 % (ref 34–46.6)
HGB BLD-MCNC: 9.7 G/DL (ref 12–15.9)
MCH RBC QN AUTO: 25.5 PG (ref 26.6–33)
MCHC RBC AUTO-ENTMCNC: 31.3 G/DL (ref 31.5–35.7)
MCV RBC AUTO: 81.6 FL (ref 79–97)
PLATELET # BLD AUTO: 304 10*3/MM3 (ref 140–450)
PMV BLD AUTO: 10.4 FL (ref 6–12)
POTASSIUM SERPL-SCNC: 3.8 MMOL/L (ref 3.5–5.2)
RBC # BLD AUTO: 3.8 10*6/MM3 (ref 3.77–5.28)
SODIUM SERPL-SCNC: 141 MMOL/L (ref 136–145)
WBC NRBC COR # BLD: 8.09 10*3/MM3 (ref 3.4–10.8)

## 2023-08-02 PROCEDURE — 85027 COMPLETE CBC AUTOMATED: CPT | Performed by: INTERNAL MEDICINE

## 2023-08-02 PROCEDURE — 63710000001 PREDNISONE PER 1 MG: Performed by: INTERNAL MEDICINE

## 2023-08-02 PROCEDURE — 80048 BASIC METABOLIC PNL TOTAL CA: CPT | Performed by: INTERNAL MEDICINE

## 2023-08-02 RX ORDER — HYDROCODONE BITARTRATE AND ACETAMINOPHEN 5; 325 MG/1; MG/1
1 TABLET ORAL EVERY 6 HOURS PRN
Qty: 12 TABLET | Refills: 0 | Status: SHIPPED | OUTPATIENT
Start: 2023-08-02

## 2023-08-02 RX ORDER — VALACYCLOVIR HYDROCHLORIDE 1 G/1
1000 TABLET, FILM COATED ORAL EVERY 8 HOURS SCHEDULED
Qty: 18 TABLET | Refills: 0 | Status: SHIPPED | OUTPATIENT
Start: 2023-08-02

## 2023-08-02 RX ORDER — ONDANSETRON 4 MG/1
4 TABLET, FILM COATED ORAL EVERY 6 HOURS PRN
Qty: 30 TABLET | Refills: 0 | Status: SHIPPED | OUTPATIENT
Start: 2023-08-02

## 2023-08-02 RX ORDER — PREDNISONE 10 MG/1
TABLET ORAL
Qty: 263 TABLET | Refills: 0 | Status: SHIPPED | OUTPATIENT
Start: 2023-08-02 | End: 2023-10-11

## 2023-08-02 RX ORDER — DICYCLOMINE HYDROCHLORIDE 10 MG/1
10 CAPSULE ORAL 4 TIMES DAILY
Qty: 120 CAPSULE | Refills: 0 | Status: SHIPPED | OUTPATIENT
Start: 2023-08-02

## 2023-08-02 RX ADMIN — HYDROCODONE BITARTRATE AND ACETAMINOPHEN 1 TABLET: 5; 325 TABLET ORAL at 06:12

## 2023-08-02 RX ADMIN — DICYCLOMINE HYDROCHLORIDE 10 MG: 10 CAPSULE ORAL at 08:49

## 2023-08-02 RX ADMIN — SENNOSIDES AND DOCUSATE SODIUM 2 TABLET: 50; 8.6 TABLET ORAL at 08:49

## 2023-08-02 RX ADMIN — DICYCLOMINE HYDROCHLORIDE 10 MG: 10 CAPSULE ORAL at 12:22

## 2023-08-02 RX ADMIN — VALACYCLOVIR HYDROCHLORIDE 1000 MG: 500 TABLET, FILM COATED ORAL at 06:10

## 2023-08-02 RX ADMIN — PREDNISONE 60 MG: 20 TABLET ORAL at 08:48

## 2023-08-02 NOTE — CASE MANAGEMENT/SOCIAL WORK
Case Management Discharge Note      Final Note: Patient discharging today. No Discharge needs at this time. CM will follow.         Selected Continued Care - Discharged on 8/2/2023 Admission date: 7/28/2023 - Discharge disposition: Home or Self Care      Destination    No services have been selected for the patient.                Durable Medical Equipment    No services have been selected for the patient.                Dialysis/Infusion    No services have been selected for the patient.                Home Medical Care    No services have been selected for the patient.                Therapy    No services have been selected for the patient.                Community Resources    No services have been selected for the patient.                Community & DME    No services have been selected for the patient.                         Final Discharge Disposition Code: 01 - home or self-care

## 2023-08-02 NOTE — DISCHARGE SUMMARY
[facility] Medicine Services  DISCHARGE SUMMARY    Patient Name: Maria Victoria Pena  : 1975  MRN: [id number]    Date of Admission: 2023 11:07 AM  Date of Discharge:  2023  Primary Care Physician: No primary care provider on file.    Consults       Date and Time Order Name Status Description    2023  1:49 PM Inpatient Gastroenterology Consult Completed             Hospital Course     Presenting Problem:     Active Hospital Problems    Diagnosis  POA    **Bloody diarrhea [R19.7]  Yes    Splenomegaly [R16.1]  Unknown    Pancolitis [K51.00]  Unknown    Ulcerative colitis with rectal bleeding [K51.911]  Yes      Resolved Hospital Problems   No resolved problems to display.          Hospital Course:  Maria Victoria Pena is a 47 y.o. female with history of ulcerative colitis diagnosed 19 years ago who presents with diffuse abd pain and bloody diarrhea. GI PCR and cdiff negative. CT abd/pel with pancolitis with wall thickening and stranding from cecum to rectum. GI was consulted. C-scope  with diffuse severe inflammation in examined colon consistent with pancolitis. She was started on hydrocortisone drip.      Pancolitis with history of ulcerative colitis  Bloody diarrhea  - Imaging per above  - No history of maintenance treatment for colitis. History of steroid use about once per year  - GI consulted -- s/p c-scope  with diffuse inflammation   - Hydrocortisone gtt transitioned to PO prednisone 60mg daily, will decrease by 5mg weekly per GI recs.    - Workup pending for consideration of biologics--GI holding on starting d/t possible shingles  - Recommend DEXA outpatient with chronic intermittent steroid use  - Bentyl, PRN zofran.  Script written for 3 days of lortab  - low fiber diet  - still bloody diarrhea but much better, less blood and less frequent BM's.  Wants home.  Advised to drink plenty of liquids.      Possible Shingles Right Posterior Thigh  --started valtrex  on 7/31. Questionable since rash now on both thighs BUT it looks much better since starting valtrex so will continue treatment     Splenomegaly   - Noted on imaging      Discharge Follow Up Recommendations for outpatient labs/diagnostics:   F/u with PCP in 1 week  F/u with Dr. East/Gi in 2-3 weeks    Day of Discharge     HPI:   Feels much better.  Diarrhea last night about 5x with some blood but not large amount, states that much better than when came in.  Tolerating diet.  Wants to go home.     Review of Systems  Gen- No fevers, chills  CV- No chest pain, palpitations  Resp- No cough, dyspnea  GI- +diarrhea with blood      Vital Signs:   Temp:  [97 øF (36.1 øC)-97.8 øF (36.6 øC)] 97.3 øF (36.3 øC)  Heart Rate:  [67-94] 68  Resp:  [16-18] 18  BP: (101-122)/(59-97) 106/67      Physical Exam:  Constitutional: No acute distress, awake, alert  HENT: NCAT, mucous membranes moist  Respiratory: Clear to auscultation bilaterally, respiratory effort normal   Cardiovascular: RRR, no murmurs, rubs, or gallops  Gastrointestinal: Positive bowel sounds, soft, nontender, nondistended  Musculoskeletal: No bilateral ankle edema  Psychiatric: Appropriate affect, cooperative  Neurologic: Oriented x 3, strength symmetric in all extremities, Cranial Nerves grossly intact to confrontation, speech clear  Skin: vesicles line on posterior right thigh      Pertinent  and/or Most Recent Results     LAB RESULTS:      Lab 08/02/23  0721 08/01/23  0601 07/31/23  0637 07/30/23  0351 07/29/23  0414 07/28/23  1428 07/28/23  1246 07/28/23  1246 07/28/23  1158   WBC 8.09 7.29 6.80 6.99 7.15  --    < > 6.25  --    HEMOGLOBIN 9.7* 9.5* 10.0* 10.7* 10.9*  --    < > 11.2*  --    HEMATOCRIT 31.0* 30.9* 31.4* 34.6 34.8  --    < > 35.8  --    PLATELETS 304 308 294 304 252  --    < > 253  --    NEUTROS ABS  --   --   --   --  4.46  --   --  3.52  --    IMMATURE GRANS (ABS)  --   --   --   --  0.04  --   --  0.04  --    LYMPHS ABS  --   --   --   --  1.32  --    --  1.42  --    MONOS ABS  --   --   --   --  0.77  --   --  0.80  --    EOS ABS  --   --   --   --  0.50*  --   --  0.42*  --    MCV 81.6 81.5 81.1 81.6 80.9  --    < > 80.8  --    SED RATE  --   --   --   --   --   --   --  34*  --    CRP  --   --   --   --   --  2.76*  --   --   --    PROCALCITONIN  --   --   --   --   --   --   --   --  0.08   LACTATE  --   --   --   --   --   --   --   --  0.9    < > = values in this interval not displayed.         Lab 08/02/23  0721 08/01/23  0601 07/31/23  0637 07/30/23  0351 07/29/23  0414   SODIUM 141 140 141 139 137   POTASSIUM 3.8 3.7 4.3 4.0 3.6   CHLORIDE 105 105 106 104 103   CO2 27.0 27.0 26.0 24.0 24.0   ANION GAP 9.0 8.0 9.0 11.0 10.0   BUN 12 11 9 7 4*   CREATININE 0.57 0.50* 0.48* 0.47* 0.48*   EGFR 114.4 118.0 119.2 119.8 119.2   GLUCOSE 86 112* 116* 125* 92   CALCIUM 8.3* 8.4* 8.5* 8.6 8.2*         Lab 07/29/23  0414 07/28/23  1158   TOTAL PROTEIN 6.2 6.3   ALBUMIN 3.4* 3.5   GLOBULIN 2.8 2.8   ALT (SGPT) 12 12   AST (SGOT) 15 17   BILIRUBIN 0.3 0.3   ALK PHOS 63 70                     Brief Urine Lab Results  (Last result in the past 365 days)        Color   Clarity   Blood   Leuk Est   Nitrite   Protein   CREAT   Urine HCG        07/28/23 1759 Yellow   Clear   Trace   Negative   Negative   Negative                 Microbiology Results (last 10 days)       Procedure Component Value - Date/Time    Gastrointestinal Panel, PCR - Stool, Per Rectum [[id number]]  (Normal) Collected: 07/28/23 1240    Lab Status: Final result Specimen: Stool from Per Rectum Updated: 07/28/23 1434     Campylobacter Not Detected     Plesiomonas shigelloides Not Detected     Salmonella Not Detected     Vibrio Not Detected     Vibrio cholerae Not Detected     Yersinia enterocolitica Not Detected     Enteroaggregative E. coli (EAEC) Not Detected     Enteropathogenic E. coli (EPEC) Not Detected     Enterotoxigenic E. coli (ETEC) lt/st Not Detected     Shiga-like toxin-producing E. coli  (STEC) stx1/stx2 Not Detected     Shigella/Enteroinvasive E. coli (EIEC) Not Detected     Cryptosporidium Not Detected     Cyclospora cayetanensis Not Detected     Entamoeba histolytica Not Detected     Giardia lamblia Not Detected     Adenovirus F40/41 Not Detected     Astrovirus Not Detected     Norovirus GI/GII Not Detected     Rotavirus A Not Detected     Sapovirus (I, II, IV or V) Not Detected    Clostridioides difficile Toxin - Stool, Per Rectum [[id number]]  (Normal) Collected: 07/28/23 1240    Lab Status: Final result Specimen: Stool from Per Rectum Updated: 07/28/23 1406    Narrative:      The following orders were created for panel order Clostridioides difficile Toxin - Stool, Per Rectum.  Procedure                               Abnormality         Status                     ---------                               -----------         ------                     Clostridioides difficile...[[id number]]  Normal              Final result                 Please view results for these tests on the individual orders.    Clostridioides difficile Toxin, PCR - Stool, Per Rectum [[id number]]  (Normal) Collected: 07/28/23 1240    Lab Status: Final result Specimen: Stool from Per Rectum Updated: 07/28/23 1406     Toxigenic C. difficile by PCR Not Detected    Narrative:      The result indicates the absence of toxigenic C. difficile from stool specimen.             CT Abdomen Pelvis With Contrast    Result Date: 7/28/2023  CT ABDOMEN PELVIS W CONTRAST Date of Exam: 7/28/2023 3:00 PM EDT Indication: abd pain, h/o uc,. Comparison: None available. Technique: Axial CT images were obtained of the abdomen and pelvis following the uneventful intravenous administration of . Reconstructed coronal and sagittal images were also obtained. Automated exposure control and iterative construction methods were used. Findings: Limited evaluation of the lung bases demonstrate no gross abnormality. The liver is unremarkable. There are no radio  opaque gallbladder calculi. The spleen is enlarged measuring 12.3 cm. 7The pancreases is unremarkable. There are no adrenal nodules. The kidneys are within normal limits. There is no small bowel obstruction . The appendix is unremarkable. There is diffuse wall thickening of the colon with pericolonic stranding from the cecum through the rectum consistent with a pancolitis. The urinary bladder is unremarkable. The uterus is unremarkable. There is no pelvic free fluid. The osseous structures are within normal limits. IMPESSION : Pancolitis with wall thickening pericolonic stranding from the cecum to the rectum. No evidence of bowel obstruction. Splenomegaly. Electronically Signed: Zia Rivero  7/28/2023 3:17 PM EDT  Workstation ID: [id number]                 Plan for Follow-up of Pending Labs/Results:   Pending Labs       Order Current Status    Histoplasma Ag Ur - Urine, Urine, Clean Catch In process    RiskImmune In process          Discharge Details        Discharge Medications        New Medications        Instructions Start Date   benzocaine 10 % mucosal gel  Commonly known as: ORAJEL   Mouth/Throat, 4 Times Daily PRN      dicyclomine 10 MG capsule  Commonly known as: BENTYL   10 mg, Oral, 4 Times Daily      HYDROcodone-acetaminophen 5-325 MG per tablet  Commonly known as: NORCO   1 tablet, Oral, Every 6 Hours PRN      ondansetron 4 MG tablet  Commonly known as: ZOFRAN   4 mg, Oral, Every 6 Hours PRN      predniSONE 10 MG tablet  Commonly known as: DELTASONE   Take 6 tablets by mouth Daily for 7 days, THEN 5.5 tablets Daily for 7 days, THEN 5 tablets Daily for 7 days, THEN 4.5 tablets Daily for 7 days, THEN 4 tablets Daily for 7 days, THEN 3.5 tablets Daily for 7 days, THEN 3 tablets Daily for 7 days, THEN 2.5 tablets Daily for 7 days, THEN 2 tablets Daily for 7 days, THEN 1.5 tablets Daily for 7 days.   Start Date: August 2, 2023     valACYclovir 1000 MG tablet  Commonly known as: VALTREX   1,000 mg, Oral,  Every 8 Hours Scheduled               No Known Allergies      Discharge Disposition:  Home or Self Care    Diet:  Hospital:  Diet Order   Procedures    Diet: Gastrointestinal Diets; Fiber-Restricted; Texture: Regular Texture (IDDSI 7); Fluid Consistency: Thin (IDDSI 0)       Activity:      Restrictions or Other Recommendations:         CODE STATUS:    Code Status and Medical Interventions:   Ordered at: 07/28/23 1534     Code Status (Patient has no pulse and is not breathing):    CPR (Attempt to Resuscitate)     Medical Interventions (Patient has pulse or is breathing):    Full Support     Release to patient:    Routine Release       Future Appointments   Date Time Provider Department Center   9/25/2023 10:30 AM Orlando Varner APRN [facility]       Additional Instructions for the Follow-ups that You Need to Schedule       Discharge Follow-up with PCP   As directed       Currently Documented PCP:    No primary care provider on file.    PCP Phone Number:    None     Follow Up Details: with PCP in 1 week        Discharge Follow-up with Specified Provider: with Dr. East/ALLY; 2 Weeks   As directed      To: with Dr. East/ALLY   Follow Up: 2 Weeks   Follow Up Details: in 2-3 weeks                      Gerson Lay MD  08/02/23      Time Spent on Discharge:  I spent  37  minutes on this discharge activity which included: face-to-face encounter with the patient, reviewing the data in the system, coordination of the care with the nursing staff as well as consultants, documentation, and entering orders.

## 2023-08-02 NOTE — PLAN OF CARE
Problem: Adult Inpatient Plan of Care  Goal: Absence of Hospital-Acquired Illness or Injury  Outcome: Ongoing, Progressing  Intervention: Identify and Manage Fall Risk  Recent Flowsheet Documentation  Taken 8/2/2023 0200 by Susan Fleming RN  Safety Promotion/Fall Prevention:   activity supervised   assistive device/personal items within reach   clutter free environment maintained   fall prevention program maintained   lighting adjusted   nonskid shoes/slippers when out of bed   room organization consistent   safety round/check completed  Taken 8/2/2023 0000 by Susan Fleming RN  Safety Promotion/Fall Prevention:   activity supervised   assistive device/personal items within reach   clutter free environment maintained   fall prevention program maintained   lighting adjusted   nonskid shoes/slippers when out of bed   room organization consistent   safety round/check completed  Taken 8/1/2023 2200 by Susan Fleming RN  Safety Promotion/Fall Prevention:   activity supervised   assistive device/personal items within reach   clutter free environment maintained   fall prevention program maintained   lighting adjusted   nonskid shoes/slippers when out of bed   room organization consistent   safety round/check completed  Taken 8/1/2023 2000 by Susan Fleming RN  Safety Promotion/Fall Prevention:   activity supervised   assistive device/personal items within reach   clutter free environment maintained   fall prevention program maintained   lighting adjusted   nonskid shoes/slippers when out of bed   room organization consistent   safety round/check completed  Intervention: Prevent Skin Injury  Recent Flowsheet Documentation  Taken 8/2/2023 0200 by Susan Fleming RN  Body Position: position changed independently  Skin Protection:   adhesive use limited   incontinence pads utilized   tubing/devices free from skin contact  Taken 8/2/2023 0000 by Susan Fleming RN  Body Position: position changed independently  Taken 8/1/2023  2200 by Susan Fleming RN  Body Position: position changed independently  Taken 8/1/2023 2000 by Susan Fleming RN  Body Position: position changed independently  Intervention: Prevent and Manage VTE (Venous Thromboembolism) Risk  Recent Flowsheet Documentation  Taken 8/2/2023 0200 by Susan Fleming RN  Activity Management: activity encouraged  Taken 8/2/2023 0000 by Susan Fleming RN  Activity Management: up ad joyce  Taken 8/1/2023 2200 by Susan Fleming RN  Activity Management: activity encouraged  Taken 8/1/2023 2000 by Susan Fleming RN  Activity Management: up ad joyce  Intervention: Prevent Infection  Recent Flowsheet Documentation  Taken 8/2/2023 0200 by Susan Fleming RN  Infection Prevention:   cohorting utilized   environmental surveillance performed   hand hygiene promoted   rest/sleep promoted   single patient room provided  Taken 8/2/2023 0000 by Susan Fleming RN  Infection Prevention:   cohorting utilized   environmental surveillance performed   hand hygiene promoted   rest/sleep promoted   single patient room provided  Taken 8/1/2023 2200 by Susan Fleming RN  Infection Prevention:   cohorting utilized   environmental surveillance performed   hand hygiene promoted   rest/sleep promoted   single patient room provided  Taken 8/1/2023 2000 by Susan Fleming RN  Infection Prevention:   cohorting utilized   environmental surveillance performed   hand hygiene promoted   rest/sleep promoted   single patient room provided   Goal Outcome Evaluation:

## 2023-08-02 NOTE — PAYOR COMM NOTE
"Mattie Solis (47 y.o. Female)     [id number]     Brenda Thomas, RN  Utilization Review  Apanm-[phone]  Prl-[phone]        Date of Birth   1975    Social Security Number       Address   [address] [place] [address]    Home Phone   [phone]    MRN   [id number]       Cheondoism   None    Marital Status                               Admission Date   23    Admission Type   Emergency    Admitting Provider   Gerson Lay MD    Attending Provider       Department, Room/Bed   [facility] 6B, N638/1       Discharge Date   2023    Discharge Disposition   Home or Self Care    Discharge Destination                                 Attending Provider: (none)   Allergies: No Known Allergies    Isolation: Airborne   Infection: Herpes Zoster (23)   Code Status: CPR    Ht: 154.9 cm (61\")   Wt: 84.5 kg (186 lb 3.2 oz)    Admission Cmt: None   Principal Problem: Bloody diarrhea [R19.7]                   Active Insurance as of 2023       Primary Coverage       Payor Plan Insurance Group Employer/Plan Group    [employer]HE[employer] BLUE CROSS AN[employer]M BLUE CROSS BLUE SHIELD PPO [id number]       Payor Plan Address Payor Plan Phone Number Payor Plan Fax Number Effective Dates    [phone]  2022 - None Entered    [place] [address]         Subscriber Name Subscriber Birth Date Member ID       MATTIE SOLIS 1975 [id number]                     Emergency Contacts        (Rel.) Home Phone Work Phone Mobile Phone    NICO SOLIS (Spouse) [phone] -- --                 Discharge Summary        Gerson Lay MD at 23 [facility]              [facility] Medicine Services  DISCHARGE SUMMARY    Patient Name: Mattie Solis  : 1975  MRN: [id number]    Date of Admission: 2023 11:07 AM  Date of Discharge:  2023  Primary Care Physician: No primary care provider on file.    Consults       Date " and Time Order Name Status Description    7/28/2023  1:49 PM Inpatient Gastroenterology Consult Completed             Hospital Course     Presenting Problem:     Active Hospital Problems    Diagnosis  POA    **Bloody diarrhea [R19.7]  Yes    Splenomegaly [R16.1]  Unknown    Pancolitis [K51.00]  Unknown    Ulcerative colitis with rectal bleeding [K51.911]  Yes      Resolved Hospital Problems   No resolved problems to display.          Hospital Course:  Maria Victoria Pena is a 47 y.o. female with history of ulcerative colitis diagnosed 19 years ago who presents with diffuse abd pain and bloody diarrhea. GI PCR and cdiff negative. CT abd/pel with pancolitis with wall thickening and stranding from cecum to rectum. GI was consulted. C-scope 7/29 with diffuse severe inflammation in examined colon consistent with pancolitis. She was started on hydrocortisone drip.      Pancolitis with history of ulcerative colitis  Bloody diarrhea  - Imaging per above  - No history of maintenance treatment for colitis. History of steroid use about once per year  - GI consulted -- s/p c-scope 7/29 with diffuse inflammation   - Hydrocortisone gtt transitioned to PO prednisone 60mg daily, will decrease by 5mg weekly per GI recs.    - Workup pending for consideration of biologics--GI holding on starting d/t possible shingles  - Recommend DEXA outpatient with chronic intermittent steroid use  - Bentyl, PRN zofran.  Script written for 3 days of lortab  - low fiber diet  - still bloody diarrhea but much better, less blood and less frequent BM's.  Wants home.  Advised to drink plenty of liquids.      Possible Shingles Right Posterior Thigh  --started valtrex on 7/31. Questionable since rash now on both thighs BUT it looks much better since starting valtrex so will continue treatment     Splenomegaly   - Noted on imaging      Discharge Follow Up Recommendations for outpatient labs/diagnostics:   F/u with PCP in 1 week  F/u with Dr. East/Gi in 2-3  weeks    Day of Discharge     HPI:   Feels much better.  Diarrhea last night about 5x with some blood but not large amount, states that much better than when came in.  Tolerating diet.  Wants to go home.     Review of Systems  Gen- No fevers, chills  CV- No chest pain, palpitations  Resp- No cough, dyspnea  GI- +diarrhea with blood      Vital Signs:   Temp:  [97 øF (36.1 øC)-97.8 øF (36.6 øC)] 97.3 øF (36.3 øC)  Heart Rate:  [67-94] 68  Resp:  [16-18] 18  BP: (101-122)/(59-97) 106/67      Physical Exam:  Constitutional: No acute distress, awake, alert  HENT: NCAT, mucous membranes moist  Respiratory: Clear to auscultation bilaterally, respiratory effort normal   Cardiovascular: RRR, no murmurs, rubs, or gallops  Gastrointestinal: Positive bowel sounds, soft, nontender, nondistended  Musculoskeletal: No bilateral ankle edema  Psychiatric: Appropriate affect, cooperative  Neurologic: Oriented x 3, strength symmetric in all extremities, Cranial Nerves grossly intact to confrontation, speech clear  Skin: vesicles line on posterior right thigh      Pertinent  and/or Most Recent Results     LAB RESULTS:      Lab 08/02/23  0721 08/01/23  0601 07/31/23  0637 07/30/23  0351 07/29/23  0414 07/28/23  1428 07/28/23  1246 07/28/23  1246 07/28/23  1158   WBC 8.09 7.29 6.80 6.99 7.15  --    < > 6.25  --    HEMOGLOBIN 9.7* 9.5* 10.0* 10.7* 10.9*  --    < > 11.2*  --    HEMATOCRIT 31.0* 30.9* 31.4* 34.6 34.8  --    < > 35.8  --    PLATELETS 304 308 294 304 252  --    < > 253  --    NEUTROS ABS  --   --   --   --  4.46  --   --  3.52  --    IMMATURE GRANS (ABS)  --   --   --   --  0.04  --   --  0.04  --    LYMPHS ABS  --   --   --   --  1.32  --   --  1.42  --    MONOS ABS  --   --   --   --  0.77  --   --  0.80  --    EOS ABS  --   --   --   --  0.50*  --   --  0.42*  --    MCV 81.6 81.5 81.1 81.6 80.9  --    < > 80.8  --    SED RATE  --   --   --   --   --   --   --  34*  --    CRP  --   --   --   --   --  2.76*  --   --   --     PROCALCITONIN  --   --   --   --   --   --   --   --  0.08   LACTATE  --   --   --   --   --   --   --   --  0.9    < > = values in this interval not displayed.         Lab 08/02/23  0721 08/01/23  0601 07/31/23  0637 07/30/23  0351 07/29/23  0414   SODIUM 141 140 141 139 137   POTASSIUM 3.8 3.7 4.3 4.0 3.6   CHLORIDE 105 105 106 104 103   CO2 27.0 27.0 26.0 24.0 24.0   ANION GAP 9.0 8.0 9.0 11.0 10.0   BUN 12 11 9 7 4*   CREATININE 0.57 0.50* 0.48* 0.47* 0.48*   EGFR 114.4 118.0 119.2 119.8 119.2   GLUCOSE 86 112* 116* 125* 92   CALCIUM 8.3* 8.4* 8.5* 8.6 8.2*         Lab 07/29/23  0414 07/28/23  1158   TOTAL PROTEIN 6.2 6.3   ALBUMIN 3.4* 3.5   GLOBULIN 2.8 2.8   ALT (SGPT) 12 12   AST (SGOT) 15 17   BILIRUBIN 0.3 0.3   ALK PHOS 63 70                     Brief Urine Lab Results  (Last result in the past 365 days)        Color   Clarity   Blood   Leuk Est   Nitrite   Protein   CREAT   Urine HCG        07/28/23 1759 Yellow   Clear   Trace   Negative   Negative   Negative                 Microbiology Results (last 10 days)       Procedure Component Value - Date/Time    Gastrointestinal Panel, PCR - Stool, Per Rectum [[id number]]  (Normal) Collected: 07/28/23 1240    Lab Status: Final result Specimen: Stool from Per Rectum Updated: 07/28/23 1434     Campylobacter Not Detected     Plesiomonas shigelloides Not Detected     Salmonella Not Detected     Vibrio Not Detected     Vibrio cholerae Not Detected     Yersinia enterocolitica Not Detected     Enteroaggregative E. coli (EAEC) Not Detected     Enteropathogenic E. coli (EPEC) Not Detected     Enterotoxigenic E. coli (ETEC) lt/st Not Detected     Shiga-like toxin-producing E. coli (STEC) stx1/stx2 Not Detected     Shigella/Enteroinvasive E. coli (EIEC) Not Detected     Cryptosporidium Not Detected     Cyclospora cayetanensis Not Detected     Entamoeba histolytica Not Detected     Giardia lamblia Not Detected     Adenovirus F40/41 Not Detected     Astrovirus Not  Detected     Norovirus GI/GII Not Detected     Rotavirus A Not Detected     Sapovirus (I, II, IV or V) Not Detected    Clostridioides difficile Toxin - Stool, Per Rectum [[id number]]  (Normal) Collected: 07/28/23 1240    Lab Status: Final result Specimen: Stool from Per Rectum Updated: 07/28/23 1406    Narrative:      The following orders were created for panel order Clostridioides difficile Toxin - Stool, Per Rectum.  Procedure                               Abnormality         Status                     ---------                               -----------         ------                     Clostridioides difficile...[[id number]]  Normal              Final result                 Please view results for these tests on the individual orders.    Clostridioides difficile Toxin, PCR - Stool, Per Rectum [[id number]]  (Normal) Collected: 07/28/23 1240    Lab Status: Final result Specimen: Stool from Per Rectum Updated: 07/28/23 1406     Toxigenic C. difficile by PCR Not Detected    Narrative:      The result indicates the absence of toxigenic C. difficile from stool specimen.             CT Abdomen Pelvis With Contrast    Result Date: 7/28/2023  CT ABDOMEN PELVIS W CONTRAST Date of Exam: 7/28/2023 3:00 PM EDT Indication: abd pain, h/o uc,. Comparison: None available. Technique: Axial CT images were obtained of the abdomen and pelvis following the uneventful intravenous administration of . Reconstructed coronal and sagittal images were also obtained. Automated exposure control and iterative construction methods were used. Findings: Limited evaluation of the lung bases demonstrate no gross abnormality. The liver is unremarkable. There are no radio opaque gallbladder calculi. The spleen is enlarged measuring 12.3 cm. 7The pancreases is unremarkable. There are no adrenal nodules. The kidneys are within normal limits. There is no small bowel obstruction . The appendix is unremarkable. There is diffuse wall thickening of the colon  with pericolonic stranding from the cecum through the rectum consistent with a pancolitis. The urinary bladder is unremarkable. The uterus is unremarkable. There is no pelvic free fluid. The osseous structures are within normal limits. IMPESSION : Pancolitis with wall thickening pericolonic stranding from the cecum to the rectum. No evidence of bowel obstruction. Splenomegaly. Electronically Signed: Zia Rivero  7/28/2023 3:17 PM EDT  Workstation ID: [id number]                 Plan for Follow-up of Pending Labs/Results:   Pending Labs       Order Current Status    Histoplasma Ag Ur - Urine, Urine, Clean Catch In process    RiskImmune In process          Discharge Details        Discharge Medications        New Medications        Instructions Start Date   benzocaine 10 % mucosal gel  Commonly known as: ORAJEL   Mouth/Throat, 4 Times Daily PRN      dicyclomine 10 MG capsule  Commonly known as: BENTYL   10 mg, Oral, 4 Times Daily      HYDROcodone-acetaminophen 5-325 MG per tablet  Commonly known as: NORCO   1 tablet, Oral, Every 6 Hours PRN      ondansetron 4 MG tablet  Commonly known as: ZOFRAN   4 mg, Oral, Every 6 Hours PRN      predniSONE 10 MG tablet  Commonly known as: DELTASONE   Take 6 tablets by mouth Daily for 7 days, THEN 5.5 tablets Daily for 7 days, THEN 5 tablets Daily for 7 days, THEN 4.5 tablets Daily for 7 days, THEN 4 tablets Daily for 7 days, THEN 3.5 tablets Daily for 7 days, THEN 3 tablets Daily for 7 days, THEN 2.5 tablets Daily for 7 days, THEN 2 tablets Daily for 7 days, THEN 1.5 tablets Daily for 7 days.   Start Date: August 2, 2023     valACYclovir 1000 MG tablet  Commonly known as: VALTREX   1,000 mg, Oral, Every 8 Hours Scheduled               No Known Allergies      Discharge Disposition:  Home or Self Care    Diet:  Hospital:  Diet Order   Procedures    Diet: Gastrointestinal Diets; Fiber-Restricted; Texture: Regular Texture (IDDSI 7); Fluid Consistency: Thin (IDDSI 0)        Activity:      Restrictions or Other Recommendations:         CODE STATUS:    Code Status and Medical Interventions:   Ordered at: 07/28/23 1534     Code Status (Patient has no pulse and is not breathing):    CPR (Attempt to Resuscitate)     Medical Interventions (Patient has pulse or is breathing):    Full Support     Release to patient:    Routine Release       Future Appointments   Date Time Provider Department Center   9/25/2023 10:30 AM Orlando Varner APRN [facility]       Additional Instructions for the Follow-ups that You Need to Schedule       Discharge Follow-up with PCP   As directed       Currently Documented PCP:    No primary care provider on file.    PCP Phone Number:    None     Follow Up Details: with PCP in 1 week        Discharge Follow-up with Specified Provider: with Dr. East/ALLY; 2 Weeks   As directed      To: with Dr. Burnett   Follow Up: 2 Weeks   Follow Up Details: in 2-3 weeks                      Gerson Lay MD  08/02/23      Time Spent on Discharge:  I spent  37  minutes on this discharge activity which included: face-to-face encounter with the patient, reviewing the data in the system, coordination of the care with the nursing staff as well as consultants, documentation, and entering orders.            Electronically signed by Gerson Lay MD at 08/02/23 1113

## 2023-08-02 NOTE — PLAN OF CARE
Goal Outcome Evaluation:      A&Ox4. RA. NSR. Ambulating well in room. PIV removed. Discharge teaching done. Meds to bed delivered. Sister to transport home. Safety maintained.

## 2023-08-03 ENCOUNTER — READMISSION MANAGEMENT (OUTPATIENT)
Dept: CALL CENTER | Facility: HOSPITAL | Age: 48
End: 2023-08-03
Payer: COMMERCIAL

## 2023-08-03 LAB — H CAPSUL AG UR QL IA: <0.5

## 2023-08-04 LAB — REF LAB TEST METHOD: NORMAL

## 2023-08-07 ENCOUNTER — READMISSION MANAGEMENT (OUTPATIENT)
Dept: CALL CENTER | Facility: HOSPITAL | Age: 48
End: 2023-08-07
Payer: COMMERCIAL

## 2023-08-07 NOTE — OUTREACH NOTE
General Surgery Week 1 Survey      Flowsheet Row Responses   [facility] facility patient discharged from? [place]   Does the patient have one of the following disease processes/diagnoses(primary or secondary)? General Surgery   Week 1 attempt successful? No   Unsuccessful attempts Attempt 1            KAYA WANG - Registered Nurse

## 2023-08-09 ENCOUNTER — READMISSION MANAGEMENT (OUTPATIENT)
Dept: CALL CENTER | Facility: HOSPITAL | Age: 48
End: 2023-08-09
Payer: COMMERCIAL

## 2023-08-09 NOTE — OUTREACH NOTE
General Surgery Week 1 Survey      Flowsheet Row Responses   [facility] patient discharged from? [place]   Does the patient have one of the following disease processes/diagnoses(primary or secondary)? General Surgery   Week 1 attempt successful? Yes   Call start time 1600   Call end time 1606   Discharge diagnosis Bloody diarrhea   Person spoke with today (if not patient) and relationship Patient   Meds reviewed with patient/caregiver? Yes   Does the patient have all medications related to this admission filled (includes all antibiotics, pain medications, etc.) Yes   Is the patient taking all medications as directed (includes completed medication regime)? Yes   Does the patient have a follow up appointment scheduled with their surgeon? No  [patient did not have surgery this admission, just colonoscopy. ]   Has the patient kept scheduled appointments due by today? No   What is preventing the patient from keeping their appointments? --  [Patient reports that she had to cancel the PCP appt due to conflict with spouses appt. ]   Nursing Interventions Advised to reschedule appointment   Comments GI appt 9/25/23  1030am   Has home health visited the patient within 72 hours of discharge? N/A   Psychosocial issues? No   Did the patient receive a copy of their discharge instructions? Yes   Nursing interventions Reviewed instructions with patient   What is the patient's perception of their health status since discharge? Improving   Is the patient/caregiver able to teach back steps to recovery at home? Set small, achievable goals for return to baseline health, Eat a well-balance diet   If the patient is a current smoker, are they able to teach back resources for cessation? Not a smoker   Is the patient/caregiver able to teach back the hierarchy of who to call/visit for symptoms/problems? PCP, Specialist, Home health nurse, Urgent Care, ED, 911 Yes   Week 1 call completed? Yes   Is the patient interested in additional  calls from an ambulatory ? No   Would this patient benefit from a Referral to [facility] Social Work? No   Call end time [id number]            ANGELIQUE BLUM - Registered Nurse

## 2023-08-10 NOTE — PAYOR COMM NOTE
"Mattie Pena (47 y.o. Female)     [id number]     Brenda Thomas, RN  Utilization Review  [phone]  [phone]      Clinicals for ,, dc summary was faxed on       Date of Birth   1975    Social Security Number       Address   [address] [place] [address]    Home Phone   [phone]    MRN   [id number]       [place]   None    Marital Status                               Admission Date   23    Admission Type   Emergency    Admitting Provider   Gerson Lay MD    Attending Provider       Department, Room/Bed   [facility] 6B, N638/1       Discharge Date   2023    Discharge Disposition   Home or Self Care    Discharge Destination                                 Attending Provider: (none)   Allergies: No Known Allergies    Isolation: None   Infection: Herpes Zoster (23)   Code Status: Prior    Ht: 154.9 cm (61\")   Wt: 84.5 kg (186 lb 3.2 oz)    Admission Cmt: None   Principal Problem: Bloody diarrhea [R19.7]                   Active Insurance as of 2023       Primary Coverage       Payor Plan Insurance Group Employer/Plan Group    [employer]UE [employer]OSS [employer]UE [employer] BLUE [employer] PPO [id number]       Payor Plan Address Payor Plan Phone Number Payor Plan Fax Number Effective Dates    [address] [phone]  2022 - None Entered    [place] [address]         Subscriber Name Subscriber Birth Date Member ID       MATTIE PENA 1975 [id number]                     Emergency Contacts        (Rel.) Home Phone Work Phone Mobile Phone    NICO PENA (Spouse) [phone] -- --             Maria De Jesus Goode DO   Physician  Hospitalist     Progress Notes     Signed     Date of Service: 23  Creation Time: 23     Signed       Expand All Collapse All         [facility] Medicine Services  PROGRESS NOTE     Patient Name: Mattie Pena  : 1975  MRN: " [id number]     Date of Admission: 7/28/2023  Primary Care Physician: No primary care provider on file.        Subjective      Subjective      CC:  Abd pain, diarrhea     HPI:  No acute events but no change in symptoms. Continues with diarrhea and abd cramping. Bentyl and zofran help.      ROS:  Gen- No fevers, chills  CV- No chest pain, palpitations  Resp- No cough, dyspnea  GI- + N/V/D, abd pain            Objective      Objective      Vital Signs:   Temp:  [96.8 øF (36 øC)-99.6 øF (37.6 øC)] 96.8 øF (36 øC)  Heart Rate:  [71-97] 78  Resp:  [18] 18  BP: (102-117)/(64-79) 110/64     Physical Exam:  Constitutional: No acute distress, awake, alert  HENT: NCAT, mucous membranes moist  Respiratory: Clear to auscultation bilaterally, respiratory effort normal   Cardiovascular: RRR, no murmurs, rubs, or gallops  Gastrointestinal: Positive bowel sounds, soft, diffuse tenderness  Musculoskeletal: No bilateral ankle edema  Psychiatric: Appropriate affect, cooperative  Neurologic: Oriented x 3, strength symmetric in all extremities, Cranial Nerves grossly intact to confrontation, speech clear  Skin: No rashes        Results Reviewed:  LAB RESULTS:              Lab 07/30/23  0351 07/29/23  0414 07/28/23  1428 07/28/23  1246 07/28/23  1158   WBC 6.99 7.15  --  6.25  --    HEMOGLOBIN 10.7* 10.9*  --  11.2*  --    HEMATOCRIT 34.6 34.8  --  35.8  --    PLATELETS 304 252  --  253  --    NEUTROS ABS  --  4.46  --  3.52  --    IMMATURE GRANS (ABS)  --  0.04  --  0.04  --    LYMPHS ABS  --  1.32  --  1.42  --    MONOS ABS  --  0.77  --  0.80  --    EOS ABS  --  0.50*  --  0.42*  --    MCV 81.6 80.9  --  80.8  --    SED RATE  --   --   --  34*  --    CRP  --   --  2.76*  --   --    PROCALCITONIN  --   --   --   --  0.08   LACTATE  --   --   --   --  0.9                Lab 07/30/23  0351 07/29/23  0414 07/28/23  1158   SODIUM 139 137 140   POTASSIUM 4.0 3.6 4.0   CHLORIDE 104 103 105   CO2 24.0 24.0 24.0   ANION GAP 11.0 10.0 11.0   BUN  7 4* 4*   CREATININE 0.47* 0.48* 0.50*   EGFR 119.8 119.2 118.0   GLUCOSE 125* 92 90   CALCIUM 8.6 8.2* 8.5*               Lab 07/29/23  0414 07/28/23  1158   TOTAL PROTEIN 6.2 6.3   ALBUMIN 3.4* 3.5   GLOBULIN 2.8 2.8   ALT (SGPT) 12 12   AST (SGOT) 15 17   BILIRUBIN 0.3 0.3   ALK PHOS 63 70                      Brief Urine Lab Results  (Last result in the past 365 days)          Color   Clarity   Blood   Leuk Est   Nitrite   Protein   CREAT   Urine HCG         07/28/23 1759 Yellow    Clear    Trace    Negative    Negative    Negative                             Microbiology Results Abnormal         Procedure Component Value - Date/Time     Gastrointestinal Panel, PCR - Stool, Per Rectum [[id number]]  (Normal) Collected: 07/28/23 1240     Lab Status: Final result Specimen: Stool from Per Rectum Updated: 07/28/23 1434       Campylobacter Not Detected       Plesiomonas shigelloides Not Detected       Salmonella Not Detected       Vibrio Not Detected       Vibrio cholerae Not Detected       Yersinia enterocolitica Not Detected       Enteroaggregative E. coli (EAEC) Not Detected       Enteropathogenic E. coli (EPEC) Not Detected       Enterotoxigenic E. coli (ETEC) lt/st Not Detected       Shiga-like toxin-producing E. coli (STEC) stx1/stx2 Not Detected       Shigella/Enteroinvasive E. coli (EIEC) Not Detected       Cryptosporidium Not Detected       Cyclospora cayetanensis Not Detected       Entamoeba histolytica Not Detected       Giardia lamblia Not Detected       Adenovirus F40/41 Not Detected       Astrovirus Not Detected       Norovirus GI/GII Not Detected       Rotavirus A Not Detected       Sapovirus (I, II, IV or V) Not Detected     Clostridioides difficile Toxin - Stool, Per Rectum [[id number]]  (Normal) Collected: 07/28/23 1240     Lab Status: Final result Specimen: Stool from Per Rectum Updated: 07/28/23 1406     Narrative:       The following orders were created for panel order Clostridioides difficile Toxin  - Stool, Per Rectum.  Procedure                               Abnormality         Status                     ---------                               -----------         ------                     Clostridioides difficile...[[id number]]  Normal              Final result                  Please view results for these tests on the individual orders.     Clostridioides difficile Toxin, PCR - Stool, Per Rectum [[id number]]  (Normal) Collected: 07/28/23 1240     Lab Status: Final result Specimen: Stool from Per Rectum Updated: 07/28/23 1406       Toxigenic C. difficile by PCR Not Detected     Narrative:       The result indicates the absence of toxigenic C. difficile from stool specimen.                   Radiology results from the last 24 hours:   CT Abdomen Pelvis With Contrast     Result Date: 7/28/2023  CT ABDOMEN PELVIS W CONTRAST Date of Exam: 7/28/2023 3:00 PM EDT Indication: abd pain, h/o uc,. Comparison: None available. Technique: Axial CT images were obtained of the abdomen and pelvis following the uneventful intravenous administration of . Reconstructed coronal and sagittal images were also obtained. Automated exposure control and iterative construction methods were used. Findings: Limited evaluation of the lung bases demonstrate no gross abnormality. The liver is unremarkable. There are no radio opaque gallbladder calculi. The spleen is enlarged measuring 12.3 cm. 7The pancreases is unremarkable. There are no adrenal nodules. The kidneys are within normal limits. There is no small bowel obstruction . The appendix is unremarkable. There is diffuse wall thickening of the colon with pericolonic stranding from the cecum through the rectum consistent with a pancolitis. The urinary bladder is unremarkable. The uterus is unremarkable. There is no pelvic free fluid. The osseous structures are within normal limits. IMPESSION : Pancolitis with wall thickening pericolonic stranding from the cecum to the rectum. No evidence  of bowel obstruction. Splenomegaly. Electronically Signed: Zia Rivero  7/28/2023 3:17 PM EDT  Workstation ID: [id number]             Current medications:  Scheduled Meds:dicyclomine, 10 mg, Oral, 4x Daily  senna-docusate sodium, 2 tablet, Oral, BID  sodium chloride, 10 mL, Intravenous, Q12H        Continuous Infusions:hydrocortisone (Solu-CORTEF) infusion, 200 mg, Last Rate: 12.5 mL/hr at 07/29/23 1416        PRN Meds:.  acetaminophen **OR** acetaminophen **OR** acetaminophen    senna-docusate sodium **AND** polyethylene glycol **AND** bisacodyl **AND** bisacodyl    calcium carbonate    HYDROcodone-acetaminophen    Morphine    ondansetron **OR** ondansetron    simethicone    [COMPLETED] Insert Peripheral IV **AND** sodium chloride    sodium chloride    sodium chloride           Assessment & Plan     Assessment & Plan            Active Hospital Problems     Diagnosis   POA    **Bloody diarrhea [R19.7]   Yes    Splenomegaly [R16.1]   Unknown    Pancolitis [K51.00]   Unknown    Ulcerative colitis with rectal bleeding [K51.911]   Yes       Resolved Hospital Problems   No resolved problems to display.         Brief Hospital Course to date:  Maria Victoria Pena is a 47 y.o. female with history of ulcerative colitis diagnosed 19 years ago who presents with diffuse abd pain and bloody diarrhea. GI PCR and cdiff negative. CT abd/pel with pancolitis with wall thickening and stranding from cecum to rectum. GI was consulted. C-scope 7/29 with diffuse severe inflammation in examined colon consistent with pancolitis. She was started on hydrocortisone drip.      Pancolitis with history of ulcerative colitis  Bloody diarrhea  - Imaging per above  - No history of maintenance treatment for colitis. History of steroid use about once per year  - GI consulted -- s/p c-scope 7/29 with diffuse inflammation   - Hydrocortisone gtt  - Workup pending for consideration of biologics   - Recommend DEXA outpatient with chronic intermittent  "steroid use  - Bentyl, PRN zofran, PRN pain management   - low fiber diet     Splenomegaly   - Noted on imaging     Expected Discharge Location and Transportation: home  Expected Discharge   Expected discharge date/ time has not been documented.      DVT prophylaxis:  Mechanical DVT prophylaxis orders are present.      AM-PAC 6 Clicks Score (PT): 24 (07/29/23 0724)     CODE STATUS:       Code Status and Medical Interventions:   Ordered at: 07/28/23 1535     Code Status (Patient has no pulse and is not breathing):     CPR (Attempt to Resuscitate)     Medical Interventions (Patient has pulse or is breathing):     Full Support     Release to patient:     Routine Release         Maria De Jesus Goode, DO  07/30/23                                   Cruzito, Charlotte Rosales MD   Physician  Gastroenterology     Progress Notes     Signed     Date of Service: 07/30/23 1700  Creation Time: 07/30/23 1700     Signed         GI Daily Progress Note  Subjective:     Chief Complaint:  diarrhea     Patient reports zofran and bentyl helped.  She had IV infltrate but thinks she go at least 8 hours of IV steroids yesterday;  Patient happy because she was able to eat     Objective:     /60 (BP Location: Right arm, Patient Position: Lying)   Pulse 76   Temp 97.9 øF (36.6 øC) (Oral)   Resp 18   Ht 154.9 cm (61\")   Wt 84.5 kg (186 lb 3.2 oz)   SpO2 97%   BMI 35.18 kg/mý      Physical Exam  Constitutional:       Appearance: Normal appearance.   HENT:      Nose: Nose normal.   Cardiovascular:      Rate and Rhythm: Normal rate and regular rhythm.   Pulmonary:      Effort: Pulmonary effort is normal.      Breath sounds: Normal breath sounds.   Abdominal:      General: Abdomen is flat. Bowel sounds are normal.      Palpations: Abdomen is soft.   Neurological:      General: No focal deficit present.      Mental Status: She is alert.   Psychiatric:         Mood and Affect: Mood normal.         Lab        Lab Results   Component Value Date     WBC 6.99 " 2023     HGB 10.7 (L) 2023     HGB 10.9 (L) 2023     HGB 11.2 (L) 2023     MCV 81.6 2023      2023               Lab Results   Component Value Date     GLUCOSE 125 (H) 2023     BUN 7 2023     CREATININE 0.47 (L) 2023     BCR 14.9 2023      2023     K 4.0 2023     CO2 24.0 2023     CALCIUM 8.6 2023     ALBUMIN 3.4 (L) 2023     ALKPHOS 63 2023     BILITOT 0.3 2023     ALT 12 2023     AST 15 2023         Assessment:       Bloody diarrhea    Ulcerative colitis with rectal bleeding    Splenomegaly    Pancolitis        Biopsies pending     Plan:     Continue steroid drip  Recommend DEXA outpatient  Patient will likely need to start a biologic long term for her ulcerative colitis  Needs outpatient GI follow up  Fecal calprotectin  Risk Immune  Tb testing pending  Hepatitis B negative        Charlotte East MD  23  17:01 EDT                  Gerson Lay MD   Physician  Hospitalist     Progress Notes     Signed     Date of Service: 23  Creation Time: 23     Signed       Expand All [facility] Medicine Services  PROGRESS NOTE     Patient Name: Maria Victoria Pena  : 1975  MRN: [id number]     Date of Admission: 2023  Primary Care Physician: No primary care provider on file.        Subjective      Subjective      CC:  Abd pain, diarrhea     HPI:  States that BM's less frequent but now having abdominal pain.  BRBPR but less.  Reports BM about 6-7x between 1:30AM and 7am.  C/o rash on her thigh that is burning really bad     ROS:  Gen- No fevers, chills  CV- No chest pain, palpitations  Resp- No cough, dyspnea  GI- + N/V/D, abd pain            Objective      Objective      Vital Signs:   Temp:  [97.1 øF (36.2 øC)-98.4 øF (36.9 øC)] 97.6 øF (36.4 øC)  Heart Rate:  [67-94] 94  Resp:  [16] 16  BP: (105-124)/(60-90) 120/83      Physical Exam:  Constitutional: No acute distress, awake, alert  HENT: NCAT, mucous membranes moist  Respiratory: Clear to auscultation bilaterally, respiratory effort normal   Cardiovascular: RRR, no murmurs, rubs, or gallops  Gastrointestinal: Positive bowel sounds, soft, diffuse tenderness  Musculoskeletal: No bilateral ankle edema  Psychiatric: Appropriate affect, cooperative  Neurologic: Oriented x 3, strength symmetric in all extremities, Cranial Nerves grossly intact to confrontation, speech clear  Skin: line of vesicles on posterior right thigh        Results Reviewed:  LAB RESULTS:               Lab 07/31/23  0637 07/30/23  0351 07/29/23  0414 07/28/23  1428 07/28/23  1246 07/28/23  1158   WBC 6.80 6.99 7.15  --  6.25  --    HEMOGLOBIN 10.0* 10.7* 10.9*  --  11.2*  --    HEMATOCRIT 31.4* 34.6 34.8  --  35.8  --    PLATELETS 294 304 252  --  253  --    NEUTROS ABS  --   --  4.46  --  3.52  --    IMMATURE GRANS (ABS)  --   --  0.04  --  0.04  --    LYMPHS ABS  --   --  1.32  --  1.42  --    MONOS ABS  --   --  0.77  --  0.80  --    EOS ABS  --   --  0.50*  --  0.42*  --    MCV 81.1 81.6 80.9  --  80.8  --    SED RATE  --   --   --   --  34*  --    CRP  --   --   --  2.76*  --   --    PROCALCITONIN  --   --   --   --   --  0.08   LACTATE  --   --   --   --   --  0.9                 Lab 07/31/23  0637 07/30/23  0351 07/29/23  0414 07/28/23  1158   SODIUM 141 139 137 140   POTASSIUM 4.3 4.0 3.6 4.0   CHLORIDE 106 104 103 105   CO2 26.0 24.0 24.0 24.0   ANION GAP 9.0 11.0 10.0 11.0   BUN 9 7 4* 4*   CREATININE 0.48* 0.47* 0.48* 0.50*   EGFR 119.2 119.8 119.2 118.0   GLUCOSE 116* 125* 92 90   CALCIUM 8.5* 8.6 8.2* 8.5*               Lab 07/29/23  0414 07/28/23  1158   TOTAL PROTEIN 6.2 6.3   ALBUMIN 3.4* 3.5   GLOBULIN 2.8 2.8   ALT (SGPT) 12 12   AST (SGOT) 15 17   BILIRUBIN 0.3 0.3   ALK PHOS 63 70                      Brief Urine Lab Results  (Last result in the past 365 days)          Color   Clarity   Blood    Leuk Est   Nitrite   Protein   CREAT   Urine HCG         07/28/23 1759 Yellow    Clear    Trace    Negative    Negative    Negative                             Microbiology Results Abnormal         Procedure Component Value - Date/Time     Gastrointestinal Panel, PCR - Stool, Per Rectum [[id number]]  (Normal) Collected: 07/28/23 1240     Lab Status: Final result Specimen: Stool from Per Rectum Updated: 07/28/23 1434       Campylobacter Not Detected       Plesiomonas shigelloides Not Detected       Salmonella Not Detected       Vibrio Not Detected       Vibrio cholerae Not Detected       Yersinia enterocolitica Not Detected       Enteroaggregative E. coli (EAEC) Not Detected       Enteropathogenic E. coli (EPEC) Not Detected       Enterotoxigenic E. coli (ETEC) lt/st Not Detected       Shiga-like toxin-producing E. coli (STEC) stx1/stx2 Not Detected       Shigella/Enteroinvasive E. coli (EIEC) Not Detected       Cryptosporidium Not Detected       Cyclospora cayetanensis Not Detected       Entamoeba histolytica Not Detected       Giardia lamblia Not Detected       Adenovirus F40/41 Not Detected       Astrovirus Not Detected       Norovirus GI/GII Not Detected       Rotavirus A Not Detected       Sapovirus (I, II, IV or V) Not Detected     Clostridioides difficile Toxin - Stool, Per Rectum [[id number]]  (Normal) Collected: 07/28/23 1240     Lab Status: Final result Specimen: Stool from Per Rectum Updated: 07/28/23 1406     Narrative:       The following orders were created for panel order Clostridioides difficile Toxin - Stool, Per Rectum.  Procedure                               Abnormality         Status                     ---------                               -----------         ------                     Clostridioides difficile...[[id number]]  Normal              Final result                  Please view results for these tests on the individual orders.     Clostridioides difficile Toxin, PCR - Stool, Per Rectum  [[id number]]  (Normal) Collected: 07/28/23 1240     Lab Status: Final result Specimen: Stool from Per Rectum Updated: 07/28/23 1406       Toxigenic C. difficile by PCR Not Detected     Narrative:       The result indicates the absence of toxigenic C. difficile from stool specimen.                 Radiology results from the last 24 hours:   No radiology results from the last 24 hrs              Current medications:  Scheduled Meds:dicyclomine, 10 mg, Oral, 4x Daily  senna-docusate sodium, 2 tablet, Oral, BID  sodium chloride, 10 mL, Intravenous, Q12H  valACYclovir, 1,000 mg, Oral, Q8H        Continuous Infusions:hydrocortisone (Solu-CORTEF) infusion, 200 mg, Last Rate: 200 mg (07/31/23 7097)        PRN Meds:.  acetaminophen **OR** acetaminophen **OR** acetaminophen    senna-docusate sodium **AND** polyethylene glycol **AND** bisacodyl **AND** bisacodyl    calcium carbonate    HYDROcodone-acetaminophen    Morphine    ondansetron **OR** ondansetron    simethicone    [COMPLETED] Insert Peripheral IV **AND** sodium chloride    sodium chloride    sodium chloride           Assessment & Plan     Assessment & Plan            Active Hospital Problems     Diagnosis   POA    **Bloody diarrhea [R19.7]   Yes    Splenomegaly [R16.1]   Unknown    Pancolitis [K51.00]   Unknown    Ulcerative colitis with rectal bleeding [K51.911]   Yes       Resolved Hospital Problems   No resolved problems to display.         Brief Hospital Course to date:  Maria Victoria Pena is a 47 y.o. female with history of ulcerative colitis diagnosed 19 years ago who presents with diffuse abd pain and bloody diarrhea. GI PCR and cdiff negative. CT abd/pel with pancolitis with wall thickening and stranding from cecum to rectum. GI was consulted. C-scope 7/29 with diffuse severe inflammation in examined colon consistent with pancolitis. She was started on hydrocortisone drip.      Pancolitis with history of ulcerative colitis  Bloody diarrhea  - Imaging per  "above  - No history of maintenance treatment for colitis. History of steroid use about once per year  - GI consulted -- s/p c-scope 7/29 with diffuse inflammation   - Hydrocortisone gtt  - Workup pending for consideration of biologics   - Recommend DEXA outpatient with chronic intermittent steroid use  - Bentyl, PRN zofran, PRN pain management   - low fiber diet     Possible Shingles Right Posterior Thigh  --start valtrex  --isolation     Splenomegaly   - Noted on imaging     Expected Discharge Location and Transportation: home  Expected Discharge   Expected Discharge Date: 8/1/2023; Expected Discharge Time:       DVT prophylaxis:  Mechanical DVT prophylaxis orders are present.      AM-PAC 6 Clicks Score (PT): 24 (07/30/23 0800)     CODE STATUS:       Code Status and Medical Interventions:   Ordered at: 07/28/23 1534     Code Status (Patient has no pulse and is not breathing):     CPR (Attempt to Resuscitate)     Medical Interventions (Patient has pulse or is breathing):     Full Support     Release to patient:     Routine Release         Gerson Lay MD  07/31/23                                   CruzitoCharlotte renteria MD   Physician  Gastroenterology     Progress Notes     Signed     Date of Service: 07/31/23 1654  Creation Time: 07/31/23 1654     Signed         GI Daily Progress Note  Subjective:     Chief Complaint:  I feel better     Patient has burning and papular rash with shingles suspect on right inner thigh  Has had      Objective:     /86 (BP Location: Right arm, Patient Position: Sitting)   Pulse 102   Temp 97.2 øF (36.2 øC) (Oral)   Resp 16   Ht 154.9 cm (61\")   Wt 84.5 kg (186 lb 3.2 oz)   SpO2 97%   BMI 35.18 kg/mý      Physical Exam  Constitutional:       Appearance: Normal appearance.   HENT:      Head: Normocephalic.   Abdominal:      General: Abdomen is flat. Bowel sounds are normal.      Palpations: Abdomen is soft.   Skin:     Comments: Papular rash inner thigh   Neurological:      " General: No focal deficit present.      Mental Status: She is alert.   Psychiatric:         Mood and Affect: Mood normal.         Lab        Lab Results   Component Value Date     WBC 6.80 07/31/2023     HGB 10.0 (L) 07/31/2023     HGB 10.7 (L) 07/30/2023     HGB 10.9 (L) 07/29/2023     MCV 81.1 07/31/2023      07/31/2023               Lab Results   Component Value Date     GLUCOSE 116 (H) 07/31/2023     BUN 9 07/31/2023     CREATININE 0.48 (L) 07/31/2023     BCR 18.8 07/31/2023      07/31/2023     K 4.3 07/31/2023     CO2 26.0 07/31/2023     CALCIUM 8.5 (L) 07/31/2023     ALBUMIN 3.4 (L) 07/29/2023     ALKPHOS 63 07/29/2023     BILITOT 0.3 07/29/2023     ALT 12 07/29/2023     AST 15 07/29/2023      Final Diagnosis   1.  RIGHT COLON, RANDOM BIOPSY:  Moderately active chronic colitis  Negative for dysplasia or carcinoma     2.  DESCENDING COLON, RANDOM BIOPSY:  Moderately active chronic colitis  Negative for dysplasia or carcinoma               Assessment:       Bloody diarrhea    Ulcerative colitis with rectal bleeding    Splenomegaly    Pancolitis     Patient improving with IV steroids.  Will start po prednisone 60 mg and taper by 5 mg down to to 30 mg and have office visit  Would hold off on starting anti-TNF since she may have active shingles infection and has been started on treatment  Discussed that patient likely will need biologic     Plan:      Prednisone 60 mg po daily  Daily PPI while on high dose steroids  Urine histo pending  Fecal calprotectin pending  Riskimmune pending  TB test pending  Suspect patient will be able to dc Wednesday am     Charlotte East MD  07/31/23  16:54 EDT                        Charlotte East MD   Physician  Gastroenterology     Progress Notes     Signed     Date of Service: 07/31/23 1654  Creation Time: 07/31/23 1654     Signed         GI Daily Progress Note  Subjective:     Chief Complaint:  I feel better     Patient has burning and papular rash with shingles suspect  "on right inner thigh  Has had      Objective:     /86 (BP Location: Right arm, Patient Position: Sitting)   Pulse 102   Temp 97.2 øF (36.2 øC) (Oral)   Resp 16   Ht 154.9 cm (61\")   Wt 84.5 kg (186 lb 3.2 oz)   SpO2 97%   BMI 35.18 kg/mý      Physical Exam  Constitutional:       Appearance: Normal appearance.   HENT:      Head: Normocephalic.   Abdominal:      General: Abdomen is flat. Bowel sounds are normal.      Palpations: Abdomen is soft.   Skin:     Comments: Papular rash inner thigh   Neurological:      General: No focal deficit present.      Mental Status: She is alert.   Psychiatric:         Mood and Affect: Mood normal.         Lab        Lab Results   Component Value Date     WBC 6.80 07/31/2023     HGB 10.0 (L) 07/31/2023     HGB 10.7 (L) 07/30/2023     HGB 10.9 (L) 07/29/2023     MCV 81.1 07/31/2023      07/31/2023               Lab Results   Component Value Date     GLUCOSE 116 (H) 07/31/2023     BUN 9 07/31/2023     CREATININE 0.48 (L) 07/31/2023     BCR 18.8 07/31/2023      07/31/2023     K 4.3 07/31/2023     CO2 26.0 07/31/2023     CALCIUM 8.5 (L) 07/31/2023     ALBUMIN 3.4 (L) 07/29/2023     ALKPHOS 63 07/29/2023     BILITOT 0.3 07/29/2023     ALT 12 07/29/2023     AST 15 07/29/2023      Final Diagnosis   1.  RIGHT COLON, RANDOM BIOPSY:  Moderately active chronic colitis  Negative for dysplasia or carcinoma     2.  DESCENDING COLON, RANDOM BIOPSY:  Moderately active chronic colitis  Negative for dysplasia or carcinoma               Assessment:       Bloody diarrhea    Ulcerative colitis with rectal bleeding    Splenomegaly    Pancolitis     Patient improving with IV steroids.  Will start po prednisone 60 mg and taper by 5 mg down to to 30 mg and have office visit  Would hold off on starting anti-TNF since she may have active shingles infection and has been started on treatment  Discussed that patient likely will need biologic     Plan:      Prednisone 60 mg po daily  Daily " "PPI while on high dose steroids  Urine histo pending  Fecal calprotectin pending  Riskimmune pending  TB test pending  Suspect patient will be able to dc Wednesday am     Charlotte East MD  07/31/23  16:54 EDT                          Ana Toledo PA   Physician Assistant  Gastroenterology     Progress Notes     Attested     Date of Service: 08/01/23 1202  Creation Time: 08/01/23 1202     Attested           Attestation signed by Ajith Turcios MD at 08/01/23 1917     I have reviewed this documentation and agree.                 GI Daily Progress Note  Subjective:     Chief Complaint:  \" I am doing better \"      Papular rash is present on both right and left thighs.   She states her pain is improving.         Bowel movement today was slightly more formed and without blood.        Objective:     /74 (BP Location: Left arm, Patient Position: Sitting)   Pulse 81   Temp 97.8 øF (36.6 øC) (Oral)   Resp 18   Ht 154.9 cm (61\")   Wt 84.5 kg (186 lb 3.2 oz)   SpO2 98%   BMI 35.18 kg/mý      Physical Exam  Constitutional:       General: She is not in acute distress.  Cardiovascular:      Rate and Rhythm: Normal rate and regular rhythm.   Pulmonary:      Effort: Pulmonary effort is normal. No respiratory distress.   Abdominal:      General: Bowel sounds are normal. There is no distension.      Palpations: Abdomen is soft.      Tenderness: There is no abdominal tenderness.   Skin:     Comments: Papular rash (2-3 papules) present on left and right thigh   Neurological:      Mental Status: She is alert and oriented to person, place, and time.         Lab        Lab Results   Component Value Date     WBC 7.29 08/01/2023     HGB 9.5 (L) 08/01/2023     HGB 10.0 (L) 07/31/2023     HGB 10.7 (L) 07/30/2023     MCV 81.5 08/01/2023      08/01/2023            Lab Results   Component Value Date     GLUCOSE 112 (H) 08/01/2023     BUN 11 08/01/2023     CREATININE 0.50 (L) 08/01/2023     BCR 22.0 08/01/2023      " 2023     K 3.7 2023     CO2 27.0 2023     CALCIUM 8.4 (L) 2023     ALBUMIN 3.4 (L) 2023     ALKPHOS 63 2023     BILITOT 0.3 2023     ALT 12 2023     AST 15 2023        Latest Reference Range & Units 23 18:03   Hepatitis B Surface Ag Non-Reactive  Non-Reactive      Quantiferon gold TB negative      Assessment:     Ulcerative colitis with rectal bleeding   Possible shingles, on Valtrex   Normocytic anemia, due to blood loss.  Stable      Plan:     >> Prednisone 60 mg daily.   Decrease by 5 mg weekly.     >> Continue Bentyl 10 mg QID   >> Awaiting urine histo (pending)  >> Follow up outpatient with Dr. East in 3-4 weeks and will initiate biologic therapy that time when active infection is resolved.        Anticipate home soon.  Will sign off.  Please call for any questions or concerns.       CHENCHO Malone  23  12:02 EDT               Cosigned by: Ajith Turcios MD at 23 191     Revision History           Gerson Lay MD   Physician  Hospitalist     Progress Notes     Signed     Date of Service: 23 143  Creation Time: 23     Signed       [facility] Medicine Services  PROGRESS NOTE     Patient Name: Maria Victoria Pena  : 1975  MRN: [id number]     Date of Admission: 2023  Primary Care Physician: No primary care provider on file.        Subjective      Subjective      CC:  Abd pain, diarrhea     HPI:  States that better this AM but had BM's x 5 btw 3-4 AM last night.  Was still having some clots but no blood this AM.  States that has some rash now on her left thigh as well but feels much better.  No longer burning.      ROS:  Gen- No fevers, chills  CV- No chest pain, palpitations  Resp- No cough, dyspnea  GI- + N/V/D, abd pain            Objective      Objective      Vital Signs:   Temp:  [97 øF (36.1 øC)-98 øF (36.7 øC)] 97.8 øF (36.6 øC)  Heart Rate:   [] 81  Resp:  [16-18] 18  BP: (107-137)/(74-86) 118/74     Physical Exam:  Constitutional: No acute distress, awake, alert  HENT: NCAT, mucous membranes moist  Respiratory: Clear to auscultation bilaterally, respiratory effort normal   Cardiovascular: RRR, no murmurs, rubs, or gallops  Gastrointestinal: Positive bowel sounds, soft, diffuse tenderness  Musculoskeletal: No bilateral ankle edema  Psychiatric: Appropriate affect, cooperative  Neurologic: Oriented x 3, strength symmetric in all extremities, Cranial Nerves grossly intact to confrontation, speech clear  Skin: line of vesicles on posterior right thigh look much better today but now with dried sores on left posterior thigh as well        Results Reviewed:  LAB RESULTS:                Lab 08/01/23  0601 07/31/23  0637 07/30/23  0351 07/29/23  0414 07/28/23  1428 07/28/23  1246 07/28/23  1158   WBC 7.29 6.80 6.99 7.15  --  6.25  --    HEMOGLOBIN 9.5* 10.0* 10.7* 10.9*  --  11.2*  --    HEMATOCRIT 30.9* 31.4* 34.6 34.8  --  35.8  --    PLATELETS 308 294 304 252  --  253  --    NEUTROS ABS  --   --   --  4.46  --  3.52  --    IMMATURE GRANS (ABS)  --   --   --  0.04  --  0.04  --    LYMPHS ABS  --   --   --  1.32  --  1.42  --    MONOS ABS  --   --   --  0.77  --  0.80  --    EOS ABS  --   --   --  0.50*  --  0.42*  --    MCV 81.5 81.1 81.6 80.9  --  80.8  --    SED RATE  --   --   --   --   --  34*  --    CRP  --   --   --   --  2.76*  --   --    PROCALCITONIN  --   --   --   --   --   --  0.08   LACTATE  --   --   --   --   --   --  0.9                  Lab 08/01/23  0601 07/31/23  0637 07/30/23  0351 07/29/23  0414 07/28/23  1158   SODIUM 140 141 139 137 140   POTASSIUM 3.7 4.3 4.0 3.6 4.0   CHLORIDE 105 106 104 103 105   CO2 27.0 26.0 24.0 24.0 24.0   ANION GAP 8.0 9.0 11.0 10.0 11.0   BUN 11 9 7 4* 4*   CREATININE 0.50* 0.48* 0.47* 0.48* 0.50*   EGFR 118.0 119.2 119.8 119.2 118.0   GLUCOSE 112* 116* 125* 92 90   CALCIUM 8.4* 8.5* 8.6 8.2* 8.5*                Lab 07/29/23  0414 07/28/23  1158   TOTAL PROTEIN 6.2 6.3   ALBUMIN 3.4* 3.5   GLOBULIN 2.8 2.8   ALT (SGPT) 12 12   AST (SGOT) 15 17   BILIRUBIN 0.3 0.3   ALK PHOS 63 70                      Brief Urine Lab Results  (Last result in the past 365 days)          Color   Clarity   Blood   Leuk Est   Nitrite   Protein   CREAT   Urine HCG         07/28/23 1759 Yellow    Clear    Trace    Negative    Negative    Negative                             Microbiology Results Abnormal         Procedure Component Value - Date/Time     Gastrointestinal Panel, PCR - Stool, Per Rectum [[id number]]  (Normal) Collected: 07/28/23 1240     Lab Status: Final result Specimen: Stool from Per Rectum Updated: 07/28/23 1434       Campylobacter Not Detected       Plesiomonas shigelloides Not Detected       Salmonella Not Detected       Vibrio Not Detected       Vibrio cholerae Not Detected       Yersinia enterocolitica Not Detected       Enteroaggregative E. coli (EAEC) Not Detected       Enteropathogenic E. coli (EPEC) Not Detected       Enterotoxigenic E. coli (ETEC) lt/st Not Detected       Shiga-like toxin-producing E. coli (STEC) stx1/stx2 Not Detected       Shigella/Enteroinvasive E. coli (EIEC) Not Detected       Cryptosporidium Not Detected       Cyclospora cayetanensis Not Detected       Entamoeba histolytica Not Detected       Giardia lamblia Not Detected       Adenovirus F40/41 Not Detected       Astrovirus Not Detected       Norovirus GI/GII Not Detected       Rotavirus A Not Detected       Sapovirus (I, II, IV or V) Not Detected     Clostridioides difficile Toxin - Stool, Per Rectum [[id number]]  (Normal) Collected: 07/28/23 1240     Lab Status: Final result Specimen: Stool from Per Rectum Updated: 07/28/23 1406     Narrative:       The following orders were created for panel order Clostridioides difficile Toxin - Stool, Per Rectum.  Procedure                               Abnormality         Status                      ---------                               -----------         ------                     Clostridioides difficile...[[id number]]  Normal              Final result                  Please view results for these tests on the individual orders.     Clostridioides difficile Toxin, PCR - Stool, Per Rectum [[id number]]  (Normal) Collected: 07/28/23 1240     Lab Status: Final result Specimen: Stool from Per Rectum Updated: 07/28/23 1406       Toxigenic C. difficile by PCR Not Detected     Narrative:       The result indicates the absence of toxigenic C. difficile from stool specimen.                 Radiology results from the last 24 hours:   No radiology results from the last 24 hrs              Current medications:  Scheduled Meds:dicyclomine, 10 mg, Oral, 4x Daily  predniSONE, 60 mg, Oral, Daily With Breakfast  senna-docusate sodium, 2 tablet, Oral, BID  sodium chloride, 10 mL, Intravenous, Q12H  valACYclovir, 1,000 mg, Oral, Q8H        Continuous Infusions:      PRN Meds:.  acetaminophen **OR** acetaminophen **OR** acetaminophen    senna-docusate sodium **AND** polyethylene glycol **AND** bisacodyl **AND** bisacodyl    calcium carbonate    diphenhydrAMINE-zinc acetate    HYDROcodone-acetaminophen    Morphine    ondansetron **OR** ondansetron    simethicone    [COMPLETED] Insert Peripheral IV **AND** sodium chloride    sodium chloride    sodium chloride           Assessment & Plan     Assessment & Plan            Active Hospital Problems     Diagnosis   POA    **Bloody diarrhea [R19.7]   Yes    Splenomegaly [R16.1]   Unknown    Pancolitis [K51.00]   Unknown    Ulcerative colitis with rectal bleeding [K51.911]   Yes       Resolved Hospital Problems   No resolved problems to display.         Brief Hospital Course to date:  Maria Victoria Pena is a 47 y.o. female with history of ulcerative colitis diagnosed 19 years ago who presents with diffuse abd pain and bloody diarrhea. GI PCR and cdiff negative. CT abd/pel with pancolitis  with wall thickening and stranding from cecum to rectum. GI was consulted. C-scope  with diffuse severe inflammation in examined colon consistent with pancolitis. She was started on hydrocortisone drip.      Pancolitis with history of ulcerative colitis  Bloody diarrhea  - Imaging per above  - No history of maintenance treatment for colitis. History of steroid use about once per year  - GI consulted -- s/p c-scope  with diffuse inflammation   - Hydrocortisone gtt transitioned to PO prednisone 60mg daily  - Workup pending for consideration of biologics--GI holding on starting d/t possible shingles  - Recommend DEXA outpatient with chronic intermittent steroid use  - Bentyl, PRN zofran, PRN pain management   - low fiber diet     Possible Shingles Right Posterior Thigh  --started valtrex on . Questionable since rash now on both thighs BUT it looks much better since starting valtrex so will continue treatment  --isolation     Splenomegaly   - Noted on imaging     Expected Discharge Location and Transportation: home  Expected Discharge   Expected Discharge Date: 2023; Expected Discharge Time:       DVT prophylaxis:  Mechanical DVT prophylaxis orders are present.      AM-PAC 6 Clicks Score (PT): 24 (23 0800)     CODE STATUS:       Code Status and Medical Interventions:   Ordered at: 23 1534     Code Status (Patient has no pulse and is not breathing):     CPR (Attempt to Resuscitate)     Medical Interventions (Patient has pulse or is breathing):     Full Support     Release to patient:     Routine Release         Gerson Lay MD  23                                       Discharge Summary        Gerson Lay MD at 23 1019              [facility] Medicine Services  DISCHARGE SUMMARY    Patient Name: Maria Victoria Pena  : 1975  MRN: [id number]    Date of Admission: 2023 11:07 AM  Date of Discharge:  2023  Primary Care Physician: No primary  care provider on file.    Consults       Date and Time Order Name Status Description    7/28/2023  1:49 PM Inpatient Gastroenterology Consult Completed             Hospital Course     Presenting Problem:     Active Hospital Problems    Diagnosis  POA    **Bloody diarrhea [R19.7]  Yes    Splenomegaly [R16.1]  Unknown    Pancolitis [K51.00]  Unknown    Ulcerative colitis with rectal bleeding [K51.911]  Yes      Resolved Hospital Problems   No resolved problems to display.          Hospital Course:  Maria Victoria Pena is a 47 y.o. female with history of ulcerative colitis diagnosed 19 years ago who presents with diffuse abd pain and bloody diarrhea. GI PCR and cdiff negative. CT abd/pel with pancolitis with wall thickening and stranding from cecum to rectum. GI was consulted. C-scope 7/29 with diffuse severe inflammation in examined colon consistent with pancolitis. She was started on hydrocortisone drip.      Pancolitis with history of ulcerative colitis  Bloody diarrhea  - Imaging per above  - No history of maintenance treatment for colitis. History of steroid use about once per year  - GI consulted -- s/p c-scope 7/29 with diffuse inflammation   - Hydrocortisone gtt transitioned to PO prednisone 60mg daily, will decrease by 5mg weekly per GI recs.    - Workup pending for consideration of biologics--GI holding on starting d/t possible shingles  - Recommend DEXA outpatient with chronic intermittent steroid use  - Bentyl, PRN zofran.  Script written for 3 days of lortab  - low fiber diet  - still bloody diarrhea but much better, less blood and less frequent BM's.  Wants home.  Advised to drink plenty of liquids.      Possible Shingles Right Posterior Thigh  --started valtrex on 7/31. Questionable since rash now on both thighs BUT it looks much better since starting valtrex so will continue treatment     Splenomegaly   - Noted on imaging      Discharge Follow Up Recommendations for outpatient labs/diagnostics:   F/u with  PCP in 1 week  F/u with Dr. East/Gi in 2-3 weeks    Day of Discharge     HPI:   Feels much better.  Diarrhea last night about 5x with some blood but not large amount, states that much better than when came in.  Tolerating diet.  Wants to go home.     Review of Systems  Gen- No fevers, chills  CV- No chest pain, palpitations  Resp- No cough, dyspnea  GI- +diarrhea with blood      Vital Signs:   Temp:  [97 øF (36.1 øC)-97.8 øF (36.6 øC)] 97.3 øF (36.3 øC)  Heart Rate:  [67-94] 68  Resp:  [16-18] 18  BP: (101-122)/(59-97) 106/67      Physical Exam:  Constitutional: No acute distress, awake, alert  HENT: NCAT, mucous membranes moist  Respiratory: Clear to auscultation bilaterally, respiratory effort normal   Cardiovascular: RRR, no murmurs, rubs, or gallops  Gastrointestinal: Positive bowel sounds, soft, nontender, nondistended  Musculoskeletal: No bilateral ankle edema  Psychiatric: Appropriate affect, cooperative  Neurologic: Oriented x 3, strength symmetric in all extremities, Cranial Nerves grossly intact to confrontation, speech clear  Skin: vesicles line on posterior right thigh      Pertinent  and/or Most Recent Results     LAB RESULTS:      Lab 08/02/23  0721 08/01/23  0601 07/31/23  0637 07/30/23  0351 07/29/23  0414 07/28/23  1428 07/28/23  1246 07/28/23  1246 07/28/23  1158   WBC 8.09 7.29 6.80 6.99 7.15  --    < > 6.25  --    HEMOGLOBIN 9.7* 9.5* 10.0* 10.7* 10.9*  --    < > 11.2*  --    HEMATOCRIT 31.0* 30.9* 31.4* 34.6 34.8  --    < > 35.8  --    PLATELETS 304 308 294 304 252  --    < > 253  --    NEUTROS ABS  --   --   --   --  4.46  --   --  3.52  --    IMMATURE GRANS (ABS)  --   --   --   --  0.04  --   --  0.04  --    LYMPHS ABS  --   --   --   --  1.32  --   --  1.42  --    MONOS ABS  --   --   --   --  0.77  --   --  0.80  --    EOS ABS  --   --   --   --  0.50*  --   --  0.42*  --    MCV 81.6 81.5 81.1 81.6 80.9  --    < > 80.8  --    SED RATE  --   --   --   --   --   --   --  34*  --    CRP  --    --   --   --   --  2.76*  --   --   --    PROCALCITONIN  --   --   --   --   --   --   --   --  0.08   LACTATE  --   --   --   --   --   --   --   --  0.9    < > = values in this interval not displayed.         Lab 08/02/23  0721 08/01/23  0601 07/31/23  0637 07/30/23  0351 07/29/23  0414   SODIUM 141 140 141 139 137   POTASSIUM 3.8 3.7 4.3 4.0 3.6   CHLORIDE 105 105 106 104 103   CO2 27.0 27.0 26.0 24.0 24.0   ANION GAP 9.0 8.0 9.0 11.0 10.0   BUN 12 11 9 7 4*   CREATININE 0.57 0.50* 0.48* 0.47* 0.48*   EGFR 114.4 118.0 119.2 119.8 119.2   GLUCOSE 86 112* 116* 125* 92   CALCIUM 8.3* 8.4* 8.5* 8.6 8.2*         Lab 07/29/23  0414 07/28/23  1158   TOTAL PROTEIN 6.2 6.3   ALBUMIN 3.4* 3.5   GLOBULIN 2.8 2.8   ALT (SGPT) 12 12   AST (SGOT) 15 17   BILIRUBIN 0.3 0.3   ALK PHOS 63 70                     Brief Urine Lab Results  (Last result in the past 365 days)        Color   Clarity   Blood   Leuk Est   Nitrite   Protein   CREAT   Urine HCG        07/28/23 1759 Yellow   Clear   Trace   Negative   Negative   Negative                 Microbiology Results (last 10 days)       Procedure Component Value - Date/Time    Gastrointestinal Panel, PCR - Stool, Per Rectum [[id number]]  (Normal) Collected: 07/28/23 1240    Lab Status: Final result Specimen: Stool from Per Rectum Updated: 07/28/23 1434     Campylobacter Not Detected     Plesiomonas shigelloides Not Detected     Salmonella Not Detected     Vibrio Not Detected     Vibrio cholerae Not Detected     Yersinia enterocolitica Not Detected     Enteroaggregative E. coli (EAEC) Not Detected     Enteropathogenic E. coli (EPEC) Not Detected     Enterotoxigenic E. coli (ETEC) lt/st Not Detected     Shiga-like toxin-producing E. coli (STEC) stx1/stx2 Not Detected     Shigella/Enteroinvasive E. coli (EIEC) Not Detected     Cryptosporidium Not Detected     Cyclospora cayetanensis Not Detected     Entamoeba histolytica Not Detected     Giardia lamblia Not Detected     Adenovirus F40/41  Not Detected     Astrovirus Not Detected     Norovirus GI/GII Not Detected     Rotavirus A Not Detected     Sapovirus (I, II, IV or V) Not Detected    Clostridioides difficile Toxin - Stool, Per Rectum [[id number]]  (Normal) Collected: 07/28/23 1240    Lab Status: Final result Specimen: Stool from Per Rectum Updated: 07/28/23 1406    Narrative:      The following orders were created for panel order Clostridioides difficile Toxin - Stool, Per Rectum.  Procedure                               Abnormality         Status                     ---------                               -----------         ------                     Clostridioides difficile...[[id number]]  Normal              Final result                 Please view results for these tests on the individual orders.    Clostridioides difficile Toxin, PCR - Stool, Per Rectum [[id number]]  (Normal) Collected: 07/28/23 1240    Lab Status: Final result Specimen: Stool from Per Rectum Updated: 07/28/23 1406     Toxigenic C. difficile by PCR Not Detected    Narrative:      The result indicates the absence of toxigenic C. difficile from stool specimen.             CT Abdomen Pelvis With Contrast    Result Date: 7/28/2023  CT ABDOMEN PELVIS W CONTRAST Date of Exam: 7/28/2023 3:00 PM EDT Indication: abd pain, h/o uc,. Comparison: None available. Technique: Axial CT images were obtained of the abdomen and pelvis following the uneventful intravenous administration of . Reconstructed coronal and sagittal images were also obtained. Automated exposure control and iterative construction methods were used. Findings: Limited evaluation of the lung bases demonstrate no gross abnormality. The liver is unremarkable. There are no radio opaque gallbladder calculi. The spleen is enlarged measuring 12.3 cm. 7The pancreases is unremarkable. There are no adrenal nodules. The kidneys are within normal limits. There is no small bowel obstruction . The appendix is unremarkable. There is  diffuse wall thickening of the colon with pericolonic stranding from the cecum through the rectum consistent with a pancolitis. The urinary bladder is unremarkable. The uterus is unremarkable. There is no pelvic free fluid. The osseous structures are within normal limits. IMPESSION : Pancolitis with wall thickening pericolonic stranding from the cecum to the rectum. No evidence of bowel obstruction. Splenomegaly. Electronically Signed: Zia Rivero  7/28/2023 3:17 PM EDT  Workstation ID: [id number]                 Plan for Follow-up of Pending Labs/Results:   Pending Labs       Order Current Status    Histoplasma Ag Ur - Urine, Urine, Clean Catch In process    RiskImmune In process          Discharge Details        Discharge Medications        New Medications        Instructions Start Date   benzocaine 10 % mucosal gel  Commonly known as: ORAJEL   Mouth/Throat, 4 Times Daily PRN      dicyclomine 10 MG capsule  Commonly known as: BENTYL   10 mg, Oral, 4 Times Daily      HYDROcodone-acetaminophen 5-325 MG per tablet  Commonly known as: NORCO   1 tablet, Oral, Every 6 Hours PRN      ondansetron 4 MG tablet  Commonly known as: ZOFRAN   4 mg, Oral, Every 6 Hours PRN      predniSONE 10 MG tablet  Commonly known as: DELTASONE   Take 6 tablets by mouth Daily for 7 days, THEN 5.5 tablets Daily for 7 days, THEN 5 tablets Daily for 7 days, THEN 4.5 tablets Daily for 7 days, THEN 4 tablets Daily for 7 days, THEN 3.5 tablets Daily for 7 days, THEN 3 tablets Daily for 7 days, THEN 2.5 tablets Daily for 7 days, THEN 2 tablets Daily for 7 days, THEN 1.5 tablets Daily for 7 days.   Start Date: August 2, 2023     valACYclovir 1000 MG tablet  Commonly known as: VALTREX   1,000 mg, Oral, Every 8 Hours Scheduled               No Known Allergies      Discharge Disposition:  Home or Self Care    Diet:  Hospital:  Diet Order   Procedures    Diet: Gastrointestinal Diets; Fiber-Restricted; Texture: Regular Texture (IDDSI 7); Fluid  Consistency: Thin (IDDSI 0)       Activity:      Restrictions or Other Recommendations:         CODE STATUS:    Code Status and Medical Interventions:   Ordered at: 07/28/23 1534     Code Status (Patient has no pulse and is not breathing):    CPR (Attempt to Resuscitate)     Medical Interventions (Patient has pulse or is breathing):    Full Support     Release to patient:    Routine Release       Future Appointments   Date Time Provider Department Center   9/25/2023 10:30 AM Orlando Varner APRN [facility]       Additional Instructions for the Follow-ups that You Need to Schedule       Discharge Follow-up with PCP   As directed       Currently Documented PCP:    No primary care provider on file.    PCP Phone Number:    None     Follow Up Details: with PCP in 1 week        Discharge Follow-up with Specified Provider: with Dr. East/ALLY; 2 Weeks   As directed      To: with Dr. Burnett   Follow Up: 2 Weeks   Follow Up Details: in 2-3 weeks                      Gerson Lay MD  08/02/23      Time Spent on Discharge:  I spent  37  minutes on this discharge activity which included: face-to-face encounter with the patient, reviewing the data in the system, coordination of the care with the nursing staff as well as consultants, documentation, and entering orders.            Electronically signed by Gerson Lay MD at 08/02/23 8966

## 2023-08-28 ENCOUNTER — TELEPHONE (OUTPATIENT)
Dept: GASTROENTEROLOGY | Facility: CLINIC | Age: 48
End: 2023-08-28
Payer: COMMERCIAL

## 2023-08-28 NOTE — TELEPHONE ENCOUNTER
Hub staff attempted to follow warm transfer process and was unsuccessful     Caller: Maria Victoria Pena    Relationship to patient: Self    Best call back number: [phone]    Patient is needing: PT CALLED STATING THAT SHE WOULD LIKE CLARIFICATION ON THE PREDNISONE PRESCRIBED TO HER ON 8/2/2023// ALSO, THE PT STATES THAT SHE HAS A LOT OF FLUID BUILT UP AND SHE WOULD LIKE TO SPEAK TO SOMEONE IN REFERENCE TO IT// PLEASE CALL PT BACK

## 2023-08-29 NOTE — TELEPHONE ENCOUNTER
I returned patient phone call and no answer. I left a voice message informing her she will need to call the doctor who prescribed this medication. I also left my contact number with my extension. I see she has an appointment scheduled with Orlando REYES in the future. She has never seen Orlando REYES.

## 2023-09-21 LAB — REF LAB TEST METHOD: NORMAL

## 2023-10-25 NOTE — PROGRESS NOTES
"GI Daily Progress Note  Subjective:    Chief Complaint:  I feel better    Patient has burning and papular rash with shingles suspect on right inner thigh  Has had     Objective:    /86 (BP Location: Right arm, Patient Position: Sitting)   Pulse 102   Temp 97.2 øF (36.2 øC) (Oral)   Resp 16   Ht 154.9 cm (61\")   Wt 84.5 kg (186 lb 3.2 oz)   SpO2 97%   BMI 35.18 kg/mý     Physical Exam  Constitutional:       Appearance: Normal appearance.   HENT:      Head: Normocephalic.   Abdominal:      General: Abdomen is flat. Bowel sounds are normal.      Palpations: Abdomen is soft.   Skin:     Comments: Papular rash inner thigh   Neurological:      General: No focal deficit present.      Mental Status: She is alert.   Psychiatric:         Mood and Affect: Mood normal.       Lab  Lab Results   Component Value Date    WBC 6.80 07/31/2023    HGB 10.0 (L) 07/31/2023    HGB 10.7 (L) 07/30/2023    HGB 10.9 (L) 07/29/2023    MCV 81.1 07/31/2023     07/31/2023       Lab Results   Component Value Date    GLUCOSE 116 (H) 07/31/2023    BUN 9 07/31/2023    CREATININE 0.48 (L) 07/31/2023    BCR 18.8 07/31/2023     07/31/2023    K 4.3 07/31/2023    CO2 26.0 07/31/2023    CALCIUM 8.5 (L) 07/31/2023    ALBUMIN 3.4 (L) 07/29/2023    ALKPHOS 63 07/29/2023    BILITOT 0.3 07/29/2023    ALT 12 07/29/2023    AST 15 07/29/2023     Final Diagnosis   1.  RIGHT COLON, RANDOM BIOPSY:  Moderately active chronic colitis  Negative for dysplasia or carcinoma     2.  DESCENDING COLON, RANDOM BIOPSY:  Moderately active chronic colitis  Negative for dysplasia or carcinoma           Assessment:      Bloody diarrhea    Ulcerative colitis with rectal bleeding    Splenomegaly    Pancolitis    Patient improving with IV steroids.  Will start po prednisone 60 mg and taper by 5 mg down to to 30 mg and have office visit  Would hold off on starting anti-TNF since she may have active shingles infection and has been started on " treatment  Discussed that patient likely will need biologic    Plan:     Prednisone 60 mg po daily  Daily PPI while on high dose steroids  Urine histo pending  Fecal calprotectin pending  Riskimmune pending  TB test pending  Suspect patient will be able to dc Wednesday am    Charlotte East MD  07/31/23  16:54 EDT     rectum

## (undated) DEVICE — INTRO ACCSR BLNT TP

## (undated) DEVICE — PAD GRND REM POLYHESIVE A/ DISP

## (undated) DEVICE — MEDI-VAC NON-CONDUCTIVE SUCTION TUBING: Brand: CARDINAL HEALTH

## (undated) DEVICE — ENDOGATOR HYBRID TUBING KIT FOR USE WITH ENDOGATOR IRRIGATION PUMP, OLYMPUS PUMP, GI4000 ESU, AND TORRENT IRRIGATION PUMP.: Brand: ENDOGATOR KIT

## (undated) DEVICE — SYR LUER SLPTP 50ML

## (undated) DEVICE — FIRST STEP BEDSIDE ADD WATER KIT - RESEALABLE STAND-UP POUCH, ENDOSCOPIC CLEANING PAD - 1 POUCH: Brand: FIRST STEP BEDSIDE ADD WATER KIT - RESEALABLE STAND-UP POUCH, ENDOSCOPIC CLEANIN

## (undated) DEVICE — ENDOSCOPY PORT CONNECTOR FOR OLYMPUS® SCOPES: Brand: ERBE

## (undated) DEVICE — HYBRID TUBING/CAP SET FOR OLYMPUS® SCOPES: Brand: ERBE

## (undated) DEVICE — LUBE JELLY FOIL PACK 1.4 OZ: Brand: MEDLINE INDUSTRIES, INC.

## (undated) DEVICE — SAFELINER SUCTION CANISTER 1000CC: Brand: DEROYAL

## (undated) DEVICE — HYBRID CO2 TUBING/CAP SET FOR OLYMPUS® SCOPES & CO2 SOURCE: Brand: ERBE

## (undated) DEVICE — SOLIDIFIER LIQ PREMISORB 1500CC

## (undated) DEVICE — FRCP BIOP RADLJAW4 HOT 2.2X240 BX40

## (undated) DEVICE — ADAPT CLN LUM OLYMP AIR/H20

## (undated) DEVICE — LUBE JELLY PK/2.75GM STRL BX/144

## (undated) DEVICE — FRCP BX RADJAW4 NDL 2.8 240CM LG OG BX40

## (undated) DEVICE — SYR LUERLOK 50ML

## (undated) DEVICE — CONTN GRAD MEAS TRIANG 32OZ BLK

## (undated) DEVICE — SOL IRR H2O BTL 1000ML STRL

## (undated) DEVICE — KT ORCA ORCAPOD DISP STRL

## (undated) DEVICE — VLV SXN AIR/H2O ORCAPOD3 1P/U STRL

## (undated) DEVICE — TUBING, SUCTION, 1/4" X 10', STRAIGHT: Brand: MEDLINE

## (undated) DEVICE — Device

## (undated) DEVICE — GLV SURG SENSICARE W/ALOE PF LF 7.5 STRL